# Patient Record
Sex: FEMALE | Race: WHITE | NOT HISPANIC OR LATINO | Employment: FULL TIME | URBAN - METROPOLITAN AREA
[De-identification: names, ages, dates, MRNs, and addresses within clinical notes are randomized per-mention and may not be internally consistent; named-entity substitution may affect disease eponyms.]

---

## 2017-03-02 ENCOUNTER — TELEPHONE (OUTPATIENT)
Dept: FAMILY MEDICINE | Facility: CLINIC | Age: 52
End: 2017-03-02

## 2017-03-02 NOTE — TELEPHONE ENCOUNTER
RN hypertension panel management  Offer free BP CK with the RN  Left message for the patient to call the clinic.    1. 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 It has been dropping, recheck in a couple of week with RN visit

## 2017-03-02 NOTE — LETTER
March 6, 2017      Tara Charlton  [address] [place] [address]              Dear Tara Charlton,    Your most recent blood pressure reading preformed at our clinic was higher than we like to see it. The goal is to have it under 140/90 in clinic. Please call our clinic [phone] to schedule a blood pressure recheck on our RN schedule. This appointment is free of charge and takes about 15 minutes to complete. Be sure to take all of your blood pressure medications, and avoid stimulants like caffeine, cold medicines, sudafed, or tobacco prior to your recheck.    If your blood pressure medication was changed by your provider recently wait 2 weeks before making this recheck appointment.     Thank you for trusting us with your health care.    Sincerely,    Dago Martinez MD / gallo

## 2017-03-06 NOTE — TELEPHONE ENCOUNTER
Left message for patient to return call to clinic.  Letter sent.  Closing encounter.  Dimple LOPEZ RN

## 2017-03-08 ENCOUNTER — TELEPHONE (OUTPATIENT)
Dept: FAMILY MEDICINE | Facility: CLINIC | Age: 52
End: 2017-03-08

## 2017-03-09 NOTE — TELEPHONE ENCOUNTER
"Clinic Action Needed:No/FYI only  Reason for Call: \"I've received a couple of call from the clinic, I think I need to come in for labs\". Advised Tara that she is need of Thyroid lab work, TSH and T4 check.  Tara appears to understand directives and agrees with plan. Transferred caller to scheduling to make a lab appointment.     Routed to: KARINA Martinez Care Team [facility]    Ana Jean Baptiste, RN  [facility]        "

## 2017-03-23 ENCOUNTER — TELEPHONE (OUTPATIENT)
Dept: FAMILY MEDICINE | Facility: CLINIC | Age: 52
End: 2017-03-23

## 2017-03-23 ENCOUNTER — ALLIED HEALTH/NURSE VISIT (OUTPATIENT)
Dept: FAMILY MEDICINE | Facility: CLINIC | Age: 52
End: 2017-03-23
Payer: COMMERCIAL

## 2017-03-23 VITALS — HEART RATE: 96 BPM | SYSTOLIC BLOOD PRESSURE: 141 MMHG | DIASTOLIC BLOOD PRESSURE: 85 MMHG

## 2017-03-23 DIAGNOSIS — Z01.30 BLOOD PRESSURE CHECK: Primary | ICD-10-CM

## 2017-03-23 DIAGNOSIS — I10 ESSENTIAL HYPERTENSION: ICD-10-CM

## 2017-03-23 DIAGNOSIS — E03.9 HYPOTHYROIDISM, UNSPECIFIED TYPE: ICD-10-CM

## 2017-03-23 LAB
T4 FREE SERPL-MCNC: 1.13 NG/DL (ref 0.76–1.46)
TSH SERPL DL<=0.05 MIU/L-ACNC: 3.65 MU/L (ref 0.4–4)

## 2017-03-23 PROCEDURE — 84443 ASSAY THYROID STIM HORMONE: CPT | Performed by: FAMILY MEDICINE

## 2017-03-23 PROCEDURE — 99207 ZZC NO CHARGE NURSE ONLY: CPT

## 2017-03-23 PROCEDURE — 84439 ASSAY OF FREE THYROXINE: CPT | Performed by: FAMILY MEDICINE

## 2017-03-23 PROCEDURE — 36415 COLL VENOUS BLD VENIPUNCTURE: CPT | Performed by: FAMILY MEDICINE

## 2017-03-23 NOTE — NURSING NOTE
Patient here for a blood pressure check today.  Was called for panel management.  Patient reports that she is taking her blood pressure medication as prescribed.  Denies symptoms of high or low blood pressure today.  Patient also had labs drawn today.  Blood pressure taken x3 because patient was talking during the second reading.    Odalis Moy RN

## 2017-03-23 NOTE — MR AVS SNAPSHOT
"              After Visit Summary   3/23/2017    Tara Charlton    MRN: [id number]           Patient Information     Date Of Birth          1965        Visit Information        Provider Department      3/23/2017 3:45 PM SAHIL BARRERA/KHLOE RODRIGUEZ [facility]        Today's Diagnoses     Blood pressure check    -  1       Follow-ups after your visit        Who to contact     If you have questions or need follow up information about today's clinic visit or your schedule please contact [facility] directly at [phone].  Normal or non-critical lab and imaging results will be communicated to you by KineMedhart, letter or phone within 4 business days after the clinic has received the results. If you do not hear from us within 7 days, please contact the clinic through KineMedhart or phone. If you have a critical or abnormal lab result, we will notify you by phone as soon as possible.  Submit refill requests through Godengo or call your pharmacy and they will forward the refill request to us. Please allow 3 business days for your refill to be completed.          Additional Information About Your Visit        MyChart Information     Godengo lets you send messages to your doctor, view your test results, renew your prescriptions, schedule appointments and more. To sign up, go to [email or web address] . Click on \"Log in\" on the left side of the screen, which will take you to the Welcome page. Then click on \"Sign up Now\" on the right side of the page.     You will be asked to enter the access code listed below, as well as some personal information. Please follow the directions to create your username and password.     Your access code is: [email or web address]  Expires: 3/28/2017  4:45 PM     Your access code will  in 90 days. If you need help or a new code, please call your [facility] or [phone].        Care EveryWhere ID     This is your Care EveryWhere ID. This could be used by other organizations to " access your [place] medical records  [id number]        Your Vitals Were     Pulse                   96            Blood Pressure from Last 3 Encounters:   03/23/17 141/85   12/28/16 156/78   08/26/15 134/70    Weight from Last 3 Encounters:   12/28/16 163 lb (73.9 kg)   08/26/15 168 lb (76.2 kg)   08/19/13 162 lb (73.5 kg)              Today, you had the following     No orders found for display       Primary Care Provider Office Phone # Fax #    Dago Martinez [phone]       [facility] [address] [place] [address]        Thank you!     Thank you for choosing [facility]  for your care. Our goal is always to provide you with excellent care. Hearing back from our patients is one way we can continue to improve our services. Please take a few minutes to complete the written survey that you may receive in the mail after your visit with us. Thank you!             Your Updated Medication List - Protect others around you: Learn how to safely use, store and throw away your medicines at www.disposemymeds.org.          This list is accurate as of: 3/23/17  4:39 PM.  Always use your most recent med list.                   Brand Name Dispense Instructions for use    buPROPion 150 MG 12 hr tablet    WELLBUTRIN SR    180 tablet    Take one tab daily for 3 days, then increase to bid, quit smoking on day 7.  Continue the medication for 2-3 months.       hydrochlorothiazide 12.5 MG capsule    MICROZIDE    90 capsule    Take 1 capsule (12.5 mg) by mouth every morning       levothyroxine 50 MCG tablet    SYNTHROID    90 tablet    Take 1 tablet (50 mcg) by mouth daily Due for labs and annual appointment 8/2016       metoprolol 100 MG 24 hr tablet    TOPROL-XL    90 tablet    Take 1 tablet (100 mg) by mouth daily

## 2017-03-23 NOTE — TELEPHONE ENCOUNTER
S-(situation): Patient here for a blood pressure check today.    B-(background): Was called for panel management. Patient reports that she is taking her blood pressure medication as prescribed. Denies symptoms of high or low blood pressure today. Patient also had labs drawn today.  BP Readings from Last 5 Encounters:   03/23/17 141/85   12/28/16 156/78   08/26/15 134/70   08/19/13 130/70   05/02/12 130/70     First two blood pressure readings today were 146/88 and 164/95    A-(assessment): Blood pressure taken x3 because patient was talking during the second reading.    R-(recommendations): Please advise.    Odalis Moy RN

## 2017-03-24 RX ORDER — LEVOTHYROXINE SODIUM 50 UG/1
50 TABLET ORAL DAILY
Qty: 90 TABLET | Refills: 3 | Status: SHIPPED | OUTPATIENT
Start: 2017-03-24 | End: 2018-03-09

## 2017-03-24 NOTE — TELEPHONE ENCOUNTER
Her blood pressure is still borderline.  I recommend to increase hydrochlorothiazide to 25 mg a day.  Please call in a prescription for #30 with no refill to her local pharmacy.  Recheck her blood pressure in 2 weeks.  Sincerely,  Dago Martinez MD

## 2017-03-27 RX ORDER — HYDROCHLOROTHIAZIDE 25 MG/1
25 TABLET ORAL DAILY
Qty: 30 TABLET | Refills: 0 | Status: SHIPPED | OUTPATIENT
Start: 2017-03-27 | End: 2017-05-05

## 2017-03-27 NOTE — TELEPHONE ENCOUNTER
Tried to reach her, Left message on answering machine for patient to call back.  Per Dr Martinez's note below, need to know which local pharmacy (she has mail order pharmacies on her chart)   Diane Payton RNC

## 2017-03-27 NOTE — TELEPHONE ENCOUNTER
Pt returned call and will fill Rx here at pharmacy in house   And call back and make apt for RN BP check   Declined apt at this time     Jacqueline Jiménez  Clinic Station

## 2017-03-30 ENCOUNTER — TELEPHONE (OUTPATIENT)
Dept: FAMILY MEDICINE | Facility: CLINIC | Age: 52
End: 2017-03-30

## 2017-03-31 NOTE — TELEPHONE ENCOUNTER
Patient left a voicemail at 637pm [facility]. Wants to know why her medications are waiting for her at [facility]. Her medications should be sent to her mail order pharmacy through her insurance. Why was it switched to [facility]?    Sheba VAZQUEZ  Central Scheduler

## 2017-03-31 NOTE — TELEPHONE ENCOUNTER
Left message for patient to return call to clinic.    CSS ok to deliver message below.    Which pharmacy does she prefer?  We will send RX to correct pharmacy.    Maryanne LOPEZ Rn

## 2017-04-04 NOTE — TELEPHONE ENCOUNTER
Pt was seen 3/23/17 for blood pressure.  hctz was sent to local pharmacy while waiting for pt to return for RN BP recheck.  Pt is aware.  Usually uses mail order but unable to do so until blood pressure regimen is regulated.  America Neal RN

## 2017-04-19 ENCOUNTER — TELEPHONE (OUTPATIENT)
Dept: FAMILY MEDICINE | Facility: CLINIC | Age: 52
End: 2017-04-19

## 2017-04-19 NOTE — LETTER
April 24, 2017      Tara Charlton  [address] [address]            Dear Tara,    Your most recent blood pressure reading preformed at our clinic was higher than we like to see it. The goal is to have it under 140/90 in clinic. Please call our clinic [phone] to schedule a blood pressure recheck on our RN schedule. This appointment is free of charge and takes about 15 minutes to complete. Be sure to take all of your blood pressure medications, and avoid stimulants like caffeine, cold medicines, sudafed, or tobacco prior to your recheck.    If your blood pressure medication was changed by your provider recently wait 2 weeks before making this recheck appointment.     Thank you for trusting us with your health care.  Sincerely,    Dago Martinez MD / nb

## 2017-04-19 NOTE — TELEPHONE ENCOUNTER
Panel Management Review      Patient has the following on her problem list:     Hypertension   Last three blood pressure readings:  BP Readings from Last 3 Encounters:   03/23/17 141/85   12/28/16 156/78   08/26/15 134/70     Blood pressure: FAILED    HTN Guidelines:  Age 18-59 BP range:  Less than 140/90  Age 60-85 with Diabetes:  Less than 140/90  Age 60-85 without Diabetes:  less than 150/90      Composite cancer screening  Chart review shows that this patient is due/due soon for the following Mammogram, Colonoscopy and Fecal Colorectal (FIT)  Summary:    Patient is due/failing the following:   BP CHECK    Action needed:   Patient needs nurse only appointment.    Type of outreach:    will call her, too early in the day right now.     Questions for provider review:    None                                                                                                                                    Diane LOVE       Chart routed to Care Team .

## 2017-04-24 NOTE — TELEPHONE ENCOUNTER
Left message for patient to return call to clinic.  Sent Letter  Closing Encounter, waiting to hear back from patient.    Maryanne LOPEZ Rn

## 2017-05-05 ENCOUNTER — ALLIED HEALTH/NURSE VISIT (OUTPATIENT)
Dept: FAMILY MEDICINE | Facility: CLINIC | Age: 52
End: 2017-05-05
Payer: COMMERCIAL

## 2017-05-05 VITALS
OXYGEN SATURATION: 95 % | HEART RATE: 81 BPM | DIASTOLIC BLOOD PRESSURE: 95 MMHG | RESPIRATION RATE: 18 BRPM | SYSTOLIC BLOOD PRESSURE: 151 MMHG

## 2017-05-05 DIAGNOSIS — I10 ESSENTIAL HYPERTENSION: ICD-10-CM

## 2017-05-05 DIAGNOSIS — I10 HTN (HYPERTENSION): Primary | ICD-10-CM

## 2017-05-05 PROCEDURE — 99207 ZZC NO CHARGE NURSE ONLY: CPT

## 2017-05-05 RX ORDER — HYDROCHLOROTHIAZIDE 25 MG/1
25 TABLET ORAL DAILY
Qty: 90 TABLET | Refills: 0 | Status: SHIPPED | OUTPATIENT
Start: 2017-05-05 | End: 2017-06-28

## 2017-05-05 NOTE — MR AVS SNAPSHOT
"              After Visit Summary   2017    Tara Charlton    MRN: [id number]           Patient Information     Date Of Birth          1965        Visit Information        Provider Department      2017 3:15 PM SAHIL BARRERA/KHLOE RODRIGUEZ [facility]        Today's Diagnoses     HTN (hypertension)    -  1    Essential hypertension           Follow-ups after your visit        Who to contact     If you have questions or need follow up information about today's clinic visit or your schedule please contact [facility] directly at [phone].  Normal or non-critical lab and imaging results will be communicated to you by TOMS Shoeshart, letter or phone within 4 business days after the clinic has received the results. If you do not hear from us within 7 days, please contact the clinic through TOMS Shoeshart or phone. If you have a critical or abnormal lab result, we will notify you by phone as soon as possible.  Submit refill requests through REBIScan or call your pharmacy and they will forward the refill request to us. Please allow 3 business days for your refill to be completed.          Additional Information About Your Visit        MyChart Information     REBIScan lets you send messages to your doctor, view your test results, renew your prescriptions, schedule appointments and more. To sign up, go to [email or web address] . Click on \"Log in\" on the left side of the screen, which will take you to the Welcome page. Then click on \"Sign up Now\" on the right side of the page.     You will be asked to enter the access code listed below, as well as some personal information. Please follow the directions to create your username and password.     Your access code is: [email or web address]  Expires: 8/3/2017  3:44 PM     Your access code will  in 90 days. If you need help or a new code, please call your [facility] or [phone].        Care EveryWhere ID     This is your Care EveryWhere ID. This could be used by " other organizations to access your [place] medical records  [id number]        Your Vitals Were     Pulse Respirations Pulse Oximetry             81 18 95%          Blood Pressure from Last 3 Encounters:   05/05/17 (!) 151/95   03/23/17 141/85   12/28/16 156/78    Weight from Last 3 Encounters:   12/28/16 163 lb (73.9 kg)   08/26/15 168 lb (76.2 kg)   08/19/13 162 lb (73.5 kg)              Today, you had the following     No orders found for display         Today's Medication Changes          These changes are accurate as of: 5/5/17  3:44 PM.  If you have any questions, ask your nurse or doctor.               These medicines have changed or have updated prescriptions.        Dose/Directions    hydrochlorothiazide 25 MG tablet   Commonly known as:  HYDRODIURIL   This may have changed:  Another medication with the same name was removed. Continue taking this medication, and follow the directions you see here.   Used for:  Essential hypertension        Dose:  25 mg   Take 1 tablet (25 mg) by mouth daily   Quantity:  90 tablet   Refills:  0            Where to get your medicines      These medications were sent to [facility] - [address], [place] [address]     Phone:  [phone]     hydrochlorothiazide 25 MG tablet                Primary Care Provider Office Phone # Fax #    Dago Martinez [phone]       [facility] [address] [address]        Thank you!     Thank you for choosing [facility]  for your care. Our goal is always to provide you with excellent care. Hearing back from our patients is one way we can continue to improve our services. Please take a few minutes to complete the written survey that you may receive in the mail after your visit with us. Thank you!             Your Updated Medication List - Protect others around you: Learn how to safely use, store and throw away  your medicines at www.disposemymeds.org.          This list is accurate as of: 5/5/17  3:44 PM.  Always use your most recent med list.                   Brand Name Dispense Instructions for use    buPROPion 150 MG 12 hr tablet    WELLBUTRIN SR    180 tablet    Take one tab daily for 3 days, then increase to bid, quit smoking on day 7.  Continue the medication for 2-3 months.       hydrochlorothiazide 25 MG tablet    HYDRODIURIL    90 tablet    Take 1 tablet (25 mg) by mouth daily       levothyroxine 50 MCG tablet    SYNTHROID    90 tablet    Take 1 tablet (50 mcg) by mouth daily       metoprolol 100 MG 24 hr tablet    TOPROL-XL    90 tablet    Take 1 tablet (100 mg) by mouth daily

## 2017-05-05 NOTE — NURSING NOTE
Patient comes into the clinic today for a BP CK.  Medications and allergies are gone over with the patient and are up to date.  Nat HINES RN

## 2017-06-28 ENCOUNTER — TELEPHONE (OUTPATIENT)
Dept: FAMILY MEDICINE | Facility: CLINIC | Age: 52
End: 2017-06-28

## 2017-06-28 DIAGNOSIS — I10 ESSENTIAL HYPERTENSION: ICD-10-CM

## 2017-06-28 NOTE — TELEPHONE ENCOUNTER
hydrochlorothiazide      Last Written Prescription Date: 05/05/2017  Last Fill Quantity: 90, # refills: 0  Last Office Visit with G, P or [facility] prescribing provider: 12/28/2016       Potassium   Date Value Ref Range Status   12/28/2016 3.3 (L) 3.4 - 5.3 mmol/L Final     Creatinine   Date Value Ref Range Status   12/28/2016 0.81 0.52 - 1.04 mg/dL Final     BP Readings from Last 3 Encounters:   05/05/17 (!) 151/95   03/23/17 141/85   12/28/16 156/78

## 2017-07-06 NOTE — TELEPHONE ENCOUNTER
Routing refill request to provider for review/approval because:  Labs out of range:  3.3 k+    Blood pressure above goal.    Thank you  Nara MEDRANO RN

## 2017-07-07 RX ORDER — HYDROCHLOROTHIAZIDE 25 MG/1
25 TABLET ORAL DAILY
Qty: 30 TABLET | Refills: 0 | Status: SHIPPED | OUTPATIENT
Start: 2017-07-07 | End: 2017-08-09

## 2017-07-07 NOTE — TELEPHONE ENCOUNTER
Left message for pt to call the care team for a provider message   Waiting on call back     Jacqueline Jiménez  Clinic Station

## 2017-07-11 ENCOUNTER — NURSE TRIAGE (OUTPATIENT)
Dept: NURSING | Facility: CLINIC | Age: 52
End: 2017-07-11

## 2017-08-09 ENCOUNTER — HOSPITAL ENCOUNTER (OUTPATIENT)
Dept: MAMMOGRAPHY | Facility: CLINIC | Age: 52
Discharge: HOME | End: 2017-08-09
Attending: FAMILY MEDICINE | Admitting: FAMILY MEDICINE
Payer: COMMERCIAL

## 2017-08-09 ENCOUNTER — OFFICE VISIT (OUTPATIENT)
Dept: FAMILY MEDICINE | Facility: CLINIC | Age: 52
End: 2017-08-09
Payer: COMMERCIAL

## 2017-08-09 VITALS
HEIGHT: 64 IN | SYSTOLIC BLOOD PRESSURE: 130 MMHG | DIASTOLIC BLOOD PRESSURE: 72 MMHG | HEART RATE: 60 BPM | BODY MASS INDEX: 29.19 KG/M2 | WEIGHT: 171 LBS | TEMPERATURE: 98.6 F

## 2017-08-09 DIAGNOSIS — Z12.31 ENCOUNTER FOR SCREENING MAMMOGRAM FOR BREAST CANCER: ICD-10-CM

## 2017-08-09 DIAGNOSIS — Z12.31 VISIT FOR SCREENING MAMMOGRAM: ICD-10-CM

## 2017-08-09 DIAGNOSIS — I10 ESSENTIAL HYPERTENSION: Primary | ICD-10-CM

## 2017-08-09 LAB — POTASSIUM SERPL-SCNC: 3.6 MMOL/L (ref 3.4–5.3)

## 2017-08-09 PROCEDURE — 36415 COLL VENOUS BLD VENIPUNCTURE: CPT | Performed by: FAMILY MEDICINE

## 2017-08-09 PROCEDURE — 99213 OFFICE O/P EST LOW 20 MIN: CPT | Performed by: FAMILY MEDICINE

## 2017-08-09 PROCEDURE — 84132 ASSAY OF SERUM POTASSIUM: CPT | Performed by: FAMILY MEDICINE

## 2017-08-09 PROCEDURE — G0202 SCR MAMMO BI INCL CAD: HCPCS

## 2017-08-09 RX ORDER — HYDROCHLOROTHIAZIDE 25 MG/1
25 TABLET ORAL DAILY
Qty: 90 TABLET | Refills: 3 | Status: SHIPPED | OUTPATIENT
Start: 2017-08-09 | End: 2018-08-14

## 2017-08-09 NOTE — PATIENT INSTRUCTIONS
Your blood pressure looks great!    I am going to check your potassium.    Please get your mammogram done today.          Thank you for choosing [facility].  You may be receiving a survey in the mail from [employer] regarding your visit today.  Please take a few minutes to complete and return the survey to let us know how we are doing.      If you have questions or concerns, please contact us via [employer] or you can contact your care team at [phone].    Our Clinic hours are:  Monday 6:40 am  to 7:00 pm  Tuesday -Friday 6:40 am to 5:00 pm    The [place] outpatient lab hours are:  Monday - Friday 6:10 am to 4:45 pm  Saturdays 7:00 am to 11:00 am  Appointments are required, call [phone]    If you have clinical questions after hours or would like to schedule an appointment,  call the clinic at [phone].

## 2017-08-09 NOTE — MR AVS SNAPSHOT
After Visit Summary   8/9/2017    Tara Charlton    MRN: [id number]           Patient Information     Date Of Birth          1965        Visit Information        Provider Department      8/9/2017 2:40 PM Dago Martinez MD [facility]        Today's Diagnoses     Essential hypertension          Care Instructions    Your blood pressure looks great!    I am going to check your potassium.    Please get your mammogram done today.          Thank you for choosing [facility].  You may be receiving a survey in the mail from [employer] regarding your visit today.  Please take a few minutes to complete and return the survey to let us know how we are doing.      If you have questions or concerns, please contact us via [facility] or you can contact your care team at [phone].    Our Clinic hours are:  Monday 6:40 am  to 7:00 pm  Tuesday -Friday 6:40 am to 5:00 pm    The [place] outpatient lab hours are:  Monday - Friday 6:10 am to 4:45 pm  Saturdays 7:00 am to 11:00 am  Appointments are required, call [phone]    If you have clinical questions after hours or would like to schedule an appointment,  call the clinic at [phone].            Follow-ups after your visit        Your next 10 appointments already scheduled     Aug 09, 2017  3:45 PM CDT   MA SCREENING DIGITAL BILATERAL with [address] [facility] ([facility])    [address] [facility] [id number] [phone]           Do not use any powder, lotion or deodorant under your arms or on your breast. If you do, we will ask you to remove it before your exam.  Wear comfortable, two-piece clothing.  If you have any allergies, tell your care team.  Bring any previous mammograms from other facilities or have them mailed to the breast center. Three-dimensional (3D) mammograms are available at [facility] locations in [facility], and [place]. [facility]  "locations include [facility] in [place]. Benefits of 3D mammograms include: - Improved rate of cancer detection - Decreases your chance of having to go back for more tests, which means fewer: - \"False-positive\" results (This means that there is an abnormal area but it isn't cancer.) - Invasive testing procedures, such as a biopsy or surgery - Can provide clearer images of the breast if you have dense breast tissue. 3D mammography is an optional exam that anyone can have with a 2D mammogram. It doesn't replace or take the place of a 2D mammogram. 2D mammograms remain an effective screening test for all women.  Not all insurance companies cover the cost of a 3D mammogram. Check with your insurance.              Future tests that were ordered for you today     Open Future Orders        Priority Expected Expires Ordered    MA Screening Digital Bilateral Routine  8/9/2018 8/9/2017            Who to contact     If you have questions or need follow up information about today's clinic visit or your schedule please contact [facility] directly at [phone].  Normal or non-critical lab and imaging results will be communicated to you by [employer], letter or phone within 4 business days after the clinic has received the results. If you do not hear from us within 7 days, please contact the clinic through [employer] or phone. If you have a critical or abnormal lab result, we will notify you by phone as soon as possible.  Submit refill requests through [employer] or call your pharmacy and they will forward the refill request to us. Please allow 3 business days for your refill to be completed.          Additional Information About Your Visit        [employer] Information     [employer] lets you send messages to your doctor, view your test results, renew your prescriptions, schedule appointments and more. To sign up, go to [email or web address] . Click on \"Log in\" on the left side of the screen, " "which will take you to the Welcome page. Then click on \"Sign up Now\" on the right side of the page.     You will be asked to enter the access code listed below, as well as some personal information. Please follow the directions to create your username and password.     Your access code is: [email or web address]  Expires: 2017  3:12 PM     Your access code will  in 90 days. If you need help or a new code, please call your [facility] or [phone].        Care EveryWhere ID     This is your Care EveryWhere ID. This could be used by other organizations to access your [place] medical records  [id number]        Your Vitals Were     Pulse Temperature Height BMI (Body Mass Index)          60 98.6  F (37  C) (Tympanic) 5' 3.75\" (1.619 m) 29.58 kg/m2         Blood Pressure from Last 3 Encounters:   17 130/72   17 (!) 151/95   17 141/85    Weight from Last 3 Encounters:   17 171 lb (77.6 kg)   16 163 lb (73.9 kg)   08/26/15 168 lb (76.2 kg)              We Performed the Following     Potassium          Today's Medication Changes          These changes are accurate as of: 17  3:12 PM.  If you have any questions, ask your nurse or doctor.               These medicines have changed or have updated prescriptions.        Dose/Directions    hydrochlorothiazide 25 MG tablet   Commonly known as:  HYDRODIURIL   This may have changed:  additional instructions   Used for:  Essential hypertension   Changed by:  Dago Martinez MD        Dose:  25 mg   Take 1 tablet (25 mg) by mouth daily   Quantity:  90 tablet   Refills:  3            Where to get your medicines      These medications were sent to [facility] - [address], [facility] [address]     Phone:  [phone]     hydrochlorothiazide 25 MG tablet                Primary Care Provider Office Phone # Fax #    Dago Martinez [phone] [address] [facility] [address]        Equal Access to Services     DARIEN ALONSO : Hadii aad ku hadratnaalicia Austin, waaxda lumurtazaadaha, qaybta delvisjeremiviraj godinez, millicent solomon. So [facility] [phone].    ATENCIÓN: Si habla español, tiene a palencia disposición servicios gratuitos de asistencia lingüística. Llame al [phone].    We comply with applicable federal civil rights laws and [place] laws. We do not discriminate on the basis of race, color, national origin, age, disability sex, sexual orientation or gender identity.            Thank you!     Thank you for choosing [facility]  for your care. Our goal is always to provide you with excellent care. Hearing back from our patients is one way we can continue to improve our services. Please take a few minutes to complete the written survey that you may receive in the mail after your visit with us. Thank you!             Your Updated Medication List - Protect others around you: Learn how to safely use, store and throw away your medicines at www.disposemymeds.org.          This list is accurate as of: 8/9/17  3:12 PM.  Always use your most recent med list.                   Brand Name Dispense Instructions for use Diagnosis    buPROPion 150 MG 12 hr tablet    WELLBUTRIN SR    180 tablet    Take one tab daily for 3 days, then increase to bid, quit smoking on day 7.  Continue the medication for 2-3 months.    Tobacco abuse       hydrochlorothiazide 25 MG tablet    HYDRODIURIL    90 tablet    Take 1 tablet (25 mg) by mouth daily    Essential hypertension       levothyroxine 50 MCG tablet    SYNTHROID    90 tablet    Take 1 tablet (50 mcg) by mouth daily    Hypothyroidism, unspecified type       metoprolol 100 MG 24 hr tablet    TOPROL-XL    90 tablet    Take 1 tablet (100 mg) by mouth daily    Essential hypertension

## 2017-08-09 NOTE — PROGRESS NOTES
"  SUBJECTIVE:                                                    Tara KUO White is a 52 year old female who presents to clinic today for the following health issues:    Chief Complaint   Patient presents with     Refill Request     needs to follow up from HCTZ dose adjustments.     Health Maintenance     is due for mammogram and colonoscopy for cancer screenings         Hypertension Follow-up      Outpatient blood pressures are not being checked.    Low Salt Diet: not monitoring salt      Amount of exercise or physical activity: very active job    Problems taking medications regularly: Yes,  problems remembering to take when at work    Medication side effects: none  Diet: low salt    Patient bp was not controlled.  She is here for bp check.  She also had a borderline potassium.  No side effect of the change in bp med.  Doing well.       Problem list and histories reviewed & adjusted, as indicated.  Additional history: as documented        Reviewed and updated as needed this visit by clinical staffTobacco  Allergies  Med Hx  Surg Hx  Fam Hx  Soc Hx      Reviewed and updated as needed this visit by Provider         ROS:  CONSTITUTIONAL:NEGATIVE for fever, chills, change in weight  RESP:NEGATIVE for significant cough or SOB  CV: NEGATIVE for chest pain, palpitations or peripheral edema  MUSCULOSKELETAL: NEGATIVE for significant arthralgias or myalgia    OBJECTIVE:                                                    /72 (Cuff Size: Adult Large)  Pulse 60  Temp 98.6  F (37  C) (Tympanic)  Ht 5' 3.75\" (1.619 m)  Wt 171 lb (77.6 kg)  BMI 29.58 kg/m2  Body mass index is 29.58 kg/(m^2).  GENERAL APPEARANCE: healthy, alert and no distress  SKIN: no suspicious lesions or rashes  NEURO: Normal strength and tone, mentation intact and speech normal         ASSESSMENT/PLAN:                                                    1. Essential hypertension  Controlled, refilled med, recheck K+  - hydrochlorothiazide " (HYDRODIURIL) 25 MG tablet; Take 1 tablet (25 mg) by mouth daily  Dispense: 90 tablet; Refill: 3  - Potassium      See Patient Instructions  Also needed mammogram so she will have this done today.  Patient was escorted to mammography.    Dago Martinez MD  [facility]

## 2017-08-09 NOTE — NURSING NOTE
"Chief Complaint   Patient presents with     Refill Request     needs to follow up from HCTZ dose adjustments.     Health Maintenance     is due for mammogram and colonoscopy for cancer screenings       Initial /84 (Cuff Size: Adult Large)  Pulse 60  Temp 98.6  F (37  C) (Tympanic)  Ht 5' 3.75\" (1.619 m)  Wt 171 lb (77.6 kg)  BMI 29.58 kg/m2 Estimated body mass index is 29.58 kg/(m^2) as calculated from the following:    Height as of this encounter: 5' 3.75\" (1.619 m).    Weight as of this encounter: 171 lb (77.6 kg).  Medication Reconciliation: complete  "

## 2017-08-09 NOTE — LETTER
Tara Charlton  [address]  [place] [address]        August 10, 2017          Dear ,    We are writing to inform you of your test results.  Normal potassium level.  Component      Latest Ref Rng & Units 8/9/2017   Potassium      3.4 - 5.3 mmol/L 3.6     If you have any questions or concerns, please call the clinic at the number listed above.       Sincerely,        Dago Martinez MD

## 2017-09-06 ENCOUNTER — HOSPITAL ENCOUNTER (OUTPATIENT)
Dept: MAMMOGRAPHY | Facility: CLINIC | Age: 52
Discharge: HOME | End: 2017-09-06
Attending: FAMILY MEDICINE | Admitting: FAMILY MEDICINE
Payer: COMMERCIAL

## 2017-09-06 DIAGNOSIS — R92.8 ABNORMAL MAMMOGRAM: ICD-10-CM

## 2017-09-06 DIAGNOSIS — R92.8 ABNORMAL MAMMOGRAM: Primary | ICD-10-CM

## 2017-09-06 PROCEDURE — G0206 DX MAMMO INCL CAD UNI: HCPCS

## 2017-12-14 ENCOUNTER — TELEPHONE (OUTPATIENT)
Dept: FAMILY MEDICINE | Facility: CLINIC | Age: 52
End: 2017-12-14

## 2017-12-14 DIAGNOSIS — I10 ESSENTIAL HYPERTENSION: ICD-10-CM

## 2017-12-18 NOTE — TELEPHONE ENCOUNTER
Routing refill request to provider for review/approval because:  BP Readings from Last 6 Encounters:   08/09/17 130/72   05/05/17 (!) 151/95   03/23/17 141/85   12/28/16 156/78   08/26/15 134/70   08/19/13 130/70     Medication last reviewed on 12-28-16. HTN reviewed for HCTZ on 8-9-17 at office visit.   Please advise refill.     JENNIFER Dodd

## 2018-01-03 RX ORDER — METOPROLOL SUCCINATE 100 MG/1
TABLET, EXTENDED RELEASE ORAL
Qty: 90 TABLET | Refills: 1 | Status: SHIPPED | OUTPATIENT
Start: 2018-01-03 | End: 2018-08-14

## 2018-01-03 NOTE — TELEPHONE ENCOUNTER
Pt calling wondering what is going on with this refill. Pharmacy told her they haven't heard anything back.   Nadia Ireland  CSS

## 2018-01-11 ENCOUNTER — TELEPHONE (OUTPATIENT)
Dept: FAMILY MEDICINE | Facility: CLINIC | Age: 53
End: 2018-01-11

## 2018-01-11 NOTE — TELEPHONE ENCOUNTER
1/11/2018    Call Regarding Preventive Health Screening Colonoscopy    Attempt 1    Message on voicemail     Comments:       Outreach   Marichuy Mas

## 2018-02-02 NOTE — TELEPHONE ENCOUNTER
2/2/2018    Call Regarding Preventive Health Screening Colonoscopy    Attempt 2    Message on voicemail     Comments:       Outreach   Marichuy Mas

## 2018-02-17 NOTE — TELEPHONE ENCOUNTER
2/17/2018    Call Regarding Preventive Health Screening Colonoscopy    Attempt 3    Message on voicemail     Comments:       Outreach   CC

## 2018-03-09 DIAGNOSIS — E03.9 HYPOTHYROIDISM, UNSPECIFIED TYPE: ICD-10-CM

## 2018-03-12 NOTE — TELEPHONE ENCOUNTER
"Requested Prescriptions   Pending Prescriptions Disp Refills     levothyroxine (SYNTHROID/LEVOTHROID) 50 MCG tablet [Pharmacy Med Name: LEVOTHYROXINE  0.05MG  TAB]  Last Written Prescription Date:  03/24/2017  Last Fill Quantity: 90,  # refills: 3   Last office visit: 8/9/2017 with prescribing provider:  Juan   Future Office Visit:     90 tablet      Sig: TAKE 1 TABLET BY MOUTH  DAILY    Thyroid Protocol Passed    3/9/2018  8:27 PM       Passed - Patient is 12 years or older       Passed - Recent (12 mo) or future (30 days) visit within the authorizing provider's specialty    Patient had office visit in the last 12 months or has a visit in the next 30 days with authorizing provider or within the authorizing provider's specialty.  See \"Patient Info\" tab in inbasket, or \"Choose Columns\" in Meds & Orders section of the refill encounter.           Passed - Normal TSH on file in past 12 months    Recent Labs   Lab Test  03/23/17   1531   TSH  3.65             Passed - No active pregnancy on record    If patient is pregnant or has had a positive pregnancy test, please check TSH.         Passed - No positive pregnancy test in past 12 months    If patient is pregnant or has had a positive pregnancy test, please check TSH.          Lewis Iglesias RT (R)    "

## 2018-03-14 RX ORDER — LEVOTHYROXINE SODIUM 50 UG/1
TABLET ORAL
Qty: 90 TABLET | Refills: 0 | Status: SHIPPED | OUTPATIENT
Start: 2018-03-14 | End: 2018-08-14

## 2018-07-25 ENCOUNTER — TELEPHONE (OUTPATIENT)
Dept: FAMILY MEDICINE | Facility: CLINIC | Age: 53
End: 2018-07-25

## 2018-07-25 DIAGNOSIS — E03.9 ACQUIRED HYPOTHYROIDISM: ICD-10-CM

## 2018-07-25 DIAGNOSIS — I10 BENIGN ESSENTIAL HYPERTENSION: ICD-10-CM

## 2018-07-25 DIAGNOSIS — E78.5 HYPERLIPIDEMIA LDL GOAL <130: ICD-10-CM

## 2018-07-25 DIAGNOSIS — I10 HTN (HYPERTENSION): ICD-10-CM

## 2018-07-25 DIAGNOSIS — E03.9 HYPOTHYROIDISM: Primary | ICD-10-CM

## 2018-07-25 NOTE — TELEPHONE ENCOUNTER
Reason for Call: Request for an order or referral:    Order or referral being requested: Labs    Date needed: at your convenience    Has the patient been seen by the PCP for this problem? YES    Additional comments: Patient states she is needing labs drawn in order to get her medications refilled for Levothyroxine and Hydrochlorothiazide. She would like to get her labs drawn first before setting up an appointment with Dr. Martinez. Please advise. Thank you.    Phone number Patient can be reached at:  Home number on file [phone] (home)    Best Time:  Anytime.    Can we leave a detailed message on this number?  YES    Call taken on 7/25/2018 at 6:30 PM by Yovany Carreno

## 2018-07-26 NOTE — TELEPHONE ENCOUNTER
No Office visit appointment set up yet.   Pended BMP and TSH with T4, Lipid panel.  Please place other labs if advisable.   Provider please review and advise. Thank you.

## 2018-08-02 DIAGNOSIS — E03.9 ACQUIRED HYPOTHYROIDISM: ICD-10-CM

## 2018-08-02 DIAGNOSIS — I10 BENIGN ESSENTIAL HYPERTENSION: ICD-10-CM

## 2018-08-02 DIAGNOSIS — E78.5 HYPERLIPIDEMIA LDL GOAL <130: ICD-10-CM

## 2018-08-02 LAB
ANION GAP SERPL CALCULATED.3IONS-SCNC: 9 MMOL/L (ref 3–14)
BUN SERPL-MCNC: 17 MG/DL (ref 7–30)
CALCIUM SERPL-MCNC: 10 MG/DL (ref 8.5–10.1)
CHLORIDE SERPL-SCNC: 98 MMOL/L (ref 94–109)
CHOLEST SERPL-MCNC: 275 MG/DL
CO2 SERPL-SCNC: 31 MMOL/L (ref 20–32)
CREAT SERPL-MCNC: 1.07 MG/DL (ref 0.52–1.04)
GFR SERPL CREATININE-BSD FRML MDRD: 53 ML/MIN/1.7M2
GLUCOSE SERPL-MCNC: 95 MG/DL (ref 70–99)
HDLC SERPL-MCNC: 64 MG/DL
LDLC SERPL CALC-MCNC: 177 MG/DL
NONHDLC SERPL-MCNC: 211 MG/DL
POTASSIUM SERPL-SCNC: 2.8 MMOL/L (ref 3.4–5.3)
SODIUM SERPL-SCNC: 138 MMOL/L (ref 133–144)
TRIGL SERPL-MCNC: 169 MG/DL
TSH SERPL DL<=0.005 MIU/L-ACNC: 3.52 MU/L (ref 0.4–4)

## 2018-08-02 PROCEDURE — 80048 BASIC METABOLIC PNL TOTAL CA: CPT | Performed by: INTERNAL MEDICINE

## 2018-08-02 PROCEDURE — 84443 ASSAY THYROID STIM HORMONE: CPT | Performed by: INTERNAL MEDICINE

## 2018-08-02 PROCEDURE — 80061 LIPID PANEL: CPT | Performed by: INTERNAL MEDICINE

## 2018-08-02 PROCEDURE — 36415 COLL VENOUS BLD VENIPUNCTURE: CPT | Performed by: INTERNAL MEDICINE

## 2018-08-03 DIAGNOSIS — E87.6 HYPOKALEMIA: Primary | ICD-10-CM

## 2018-08-03 RX ORDER — POTASSIUM CHLORIDE 1500 MG/1
20 TABLET, EXTENDED RELEASE ORAL DAILY
Qty: 30 TABLET | Refills: 0 | Status: SHIPPED | OUTPATIENT
Start: 2018-08-03 | End: 2018-08-29

## 2018-08-14 ENCOUNTER — TELEPHONE (OUTPATIENT)
Dept: FAMILY MEDICINE | Facility: CLINIC | Age: 53
End: 2018-08-14

## 2018-08-14 DIAGNOSIS — E03.9 HYPOTHYROIDISM, UNSPECIFIED TYPE: ICD-10-CM

## 2018-08-14 DIAGNOSIS — I10 ESSENTIAL HYPERTENSION: ICD-10-CM

## 2018-08-14 RX ORDER — HYDROCHLOROTHIAZIDE 25 MG/1
25 TABLET ORAL DAILY
Qty: 30 TABLET | Refills: 0 | Status: SHIPPED | OUTPATIENT
Start: 2018-08-14 | End: 2018-08-29

## 2018-08-14 RX ORDER — LEVOTHYROXINE SODIUM 50 UG/1
TABLET ORAL
Qty: 30 TABLET | Refills: 0 | Status: SHIPPED | OUTPATIENT
Start: 2018-08-14 | End: 2018-08-29

## 2018-08-14 RX ORDER — METOPROLOL SUCCINATE 100 MG/1
TABLET, EXTENDED RELEASE ORAL
Qty: 30 TABLET | Refills: 0 | Status: SHIPPED | OUTPATIENT
Start: 2018-08-14 | End: 2018-08-29

## 2018-08-14 NOTE — TELEPHONE ENCOUNTER
Reason for Call: Request for an order or referral:    Order or referral being requested: Potassium    Date needed: as soon as possible    Has the patient been seen by the PCP for this problem? YES    Additional comments: Pt had blood draw and had low potassium so she was told to come back in 5 days to have labs and then make PCP appointment. Pt is out of meds on 8/22/18. Are there any other labs she needs and does she need to fast?    Phone number Patient can be reached at:  Home number on file [phone] (home)    Best Time:  any    Can we leave a detailed message on this number?      Call taken on 8/14/2018 at 3:38 PM by Nadia Ireland

## 2018-08-14 NOTE — TELEPHONE ENCOUNTER
Orders entered per result notes. She needed refills of medications to get her to her appt this month. Those were given    CHAR MorrisonN, RN

## 2018-08-28 DIAGNOSIS — E03.9 HYPOTHYROIDISM, UNSPECIFIED TYPE: ICD-10-CM

## 2018-08-28 LAB
ANION GAP SERPL CALCULATED.3IONS-SCNC: 11 MMOL/L (ref 3–14)
BUN SERPL-MCNC: 18 MG/DL (ref 7–30)
CALCIUM SERPL-MCNC: 9.2 MG/DL (ref 8.5–10.1)
CHLORIDE SERPL-SCNC: 103 MMOL/L (ref 94–109)
CO2 SERPL-SCNC: 29 MMOL/L (ref 20–32)
CREAT SERPL-MCNC: 0.91 MG/DL (ref 0.52–1.04)
GFR SERPL CREATININE-BSD FRML MDRD: 64 ML/MIN/1.7M2
GLUCOSE SERPL-MCNC: 139 MG/DL (ref 70–99)
POTASSIUM SERPL-SCNC: 3.5 MMOL/L (ref 3.4–5.3)
SODIUM SERPL-SCNC: 143 MMOL/L (ref 133–144)

## 2018-08-28 PROCEDURE — 36415 COLL VENOUS BLD VENIPUNCTURE: CPT | Performed by: FAMILY MEDICINE

## 2018-08-28 PROCEDURE — 80048 BASIC METABOLIC PNL TOTAL CA: CPT | Performed by: FAMILY MEDICINE

## 2018-08-29 ENCOUNTER — OFFICE VISIT (OUTPATIENT)
Dept: FAMILY MEDICINE | Facility: CLINIC | Age: 53
End: 2018-08-29
Payer: COMMERCIAL

## 2018-08-29 VITALS
HEART RATE: 76 BPM | BODY MASS INDEX: 29.76 KG/M2 | WEIGHT: 172 LBS | SYSTOLIC BLOOD PRESSURE: 136 MMHG | TEMPERATURE: 98.2 F | OXYGEN SATURATION: 98 % | DIASTOLIC BLOOD PRESSURE: 70 MMHG

## 2018-08-29 DIAGNOSIS — I10 ESSENTIAL HYPERTENSION: Primary | ICD-10-CM

## 2018-08-29 DIAGNOSIS — E78.5 HYPERLIPIDEMIA LDL GOAL <130: ICD-10-CM

## 2018-08-29 DIAGNOSIS — Z12.11 SPECIAL SCREENING FOR MALIGNANT NEOPLASMS, COLON: ICD-10-CM

## 2018-08-29 DIAGNOSIS — E03.9 HYPOTHYROIDISM, UNSPECIFIED TYPE: ICD-10-CM

## 2018-08-29 DIAGNOSIS — E87.6 HYPOKALEMIA: ICD-10-CM

## 2018-08-29 PROCEDURE — 99214 OFFICE O/P EST MOD 30 MIN: CPT | Performed by: FAMILY MEDICINE

## 2018-08-29 RX ORDER — LEVOTHYROXINE SODIUM 50 UG/1
TABLET ORAL
Qty: 90 TABLET | Refills: 3 | Status: SHIPPED | OUTPATIENT
Start: 2018-08-29 | End: 2019-07-24

## 2018-08-29 RX ORDER — POTASSIUM CHLORIDE 1500 MG/1
20 TABLET, EXTENDED RELEASE ORAL DAILY
Qty: 90 TABLET | Refills: 3 | Status: SHIPPED | OUTPATIENT
Start: 2018-08-29 | End: 2019-12-03

## 2018-08-29 RX ORDER — METOPROLOL SUCCINATE 100 MG/1
TABLET, EXTENDED RELEASE ORAL
Qty: 90 TABLET | Refills: 3 | Status: SHIPPED | OUTPATIENT
Start: 2018-08-29 | End: 2019-07-24

## 2018-08-29 RX ORDER — HYDROCHLOROTHIAZIDE 25 MG/1
25 TABLET ORAL DAILY
Qty: 90 TABLET | Refills: 3 | Status: SHIPPED | OUTPATIENT
Start: 2018-08-29 | End: 2019-07-24

## 2018-08-29 NOTE — MR AVS SNAPSHOT
After Visit Summary   8/29/2018    Tara Charlton    MRN: [id number]           Patient Information     Date Of Birth          1965        Visit Information        Provider Department      8/29/2018 1:40 PM Dago Martinez MD [facility]        Today's Diagnoses     Essential hypertension    -  1    Hypothyroidism, unspecified type        Hyperlipidemia LDL goal <130        Hypokalemia        Special screening for malignant neoplasms, colon          Care Instructions    Please get a colonoscopy.    Please get a pap screen in the future and mammogram.    I refilled your medications.          Thank you for choosing [facility].  You may be receiving a survey in the mail from [facility] [facility] regarding your visit today.  Please take a few minutes to complete and return the survey to let us know how we are doing.      If you have questions or concerns, please contact us via [employer] or you can contact your care team at [phone].    Our Clinic hours are:  Monday 6:40 am  to 7:00 pm  Tuesday -Friday 6:40 am to 5:00 pm    The [place] outpatient lab hours are:  Monday - Friday 6:10 am to 4:45 pm  Saturdays 7:00 am to 11:00 am  Appointments are required, call [phone]    If you have clinical questions after hours or would like to schedule an appointment,  call the clinic at [phone].            Follow-ups after your visit        Additional Services     GASTROENTEROLOGY ADULT REF PROCEDURE ONLY       Last Lab Result: Creatinine (mg/dL)       Date                     Value                 08/28/2018               0.91             ----------  Body mass index is 29.76 kg/(m^2).     Needed:  No  Language:  English    Patient will be contacted to schedule procedure.     Please be aware that coverage of these services is subject to the terms and limitations of your health insurance plan.  Call member services at your health plan with any benefit or coverage questions.  Any  procedures must be performed at a [place] facility OR coordinated by your clinic's referral office.    Please bring the following with you to your appointment:    (1) Any X-Rays, CTs or MRIs which have been performed.  Contact the facility where they were done to arrange for  prior to your scheduled appointment.    (2) List of current medications   (3) This referral request   (4) Any documents/labs given to you for this referral                  Follow-up notes from your care team     Return in about 1 year (around 8/29/2019).      Who to contact     If you have questions or need follow up information about today's clinic visit or your schedule please contact [facility] directly at [phone].  Normal or non-critical lab and imaging results will be communicated to you by MyChart, letter or phone within 4 business days after the clinic has received the results. If you do not hear from us within 7 days, please contact the clinic through MyChart or phone. If you have a critical or abnormal lab result, we will notify you by phone as soon as possible.  Submit refill requests through [employer] or call your pharmacy and they will forward the refill request to us. Please allow 3 business days for your refill to be completed.          Additional Information About Your Visit        Care EveryWhere ID     This is your Care EveryWhere ID. This could be used by other organizations to access your [place] medical records  [id number]        Your Vitals Were     Pulse Temperature Pulse Oximetry BMI (Body Mass Index)          76 98.2  F (36.8  C) (Tympanic) 98% 29.76 kg/m2         Blood Pressure from Last 3 Encounters:   08/29/18 136/70   08/09/17 130/72   05/05/17 (!) 151/95    Weight from Last 3 Encounters:   08/29/18 172 lb (78 kg)   08/09/17 171 lb (77.6 kg)   12/28/16 163 lb (73.9 kg)              We Performed the Following     GASTROENTEROLOGY ADULT REF PROCEDURE ONLY          Where to get your  medicines      These medications were sent to [facility] - [address]  [address] [facility] [address], [facility] [address]     Phone:  [phone]     hydrochlorothiazide 25 MG tablet    levothyroxine 50 MCG tablet    metoprolol succinate 100 MG 24 hr tablet    potassium chloride SA 20 MEQ CR tablet          Primary Care Provider Office Phone # Fax #    Dago Martinez [phone] [address] [facility] [address]        Equal Access to Services     DARIEN ALONSO : Hadii aad ku hadasho Soomaali, waaxda luqadaha, qaybta kaalmada adeegyada, waxay idiin hayaan adeeg savanah palma . So [facility] [phone].    ATENCIÓN: Si habla español, tiene a palencia disposición servicios gratuitos de asistencia lingüística. [facility] [phone].    We comply with applicable federal civil rights laws and [place] laws. We do not discriminate on the basis of race, color, national origin, age, disability, sex, sexual orientation, or gender identity.            Thank you!     Thank you for choosing [facility]  for your care. Our goal is always to provide you with excellent care. Hearing back from our patients is one way we can continue to improve our services. Please take a few minutes to complete the written survey that you may receive in the mail after your visit with us. Thank you!             Your Updated Medication List - Protect others around you: Learn how to safely use, store and throw away your medicines at www.disposemymeds.org.          This list is accurate as of 8/29/18  2:14 PM.  Always use your most recent med list.                   Brand Name Dispense Instructions for use Diagnosis    hydrochlorothiazide 25 MG tablet    HYDRODIURIL    90 tablet    Take 1 tablet (25 mg) by mouth daily    Essential hypertension       levothyroxine 50 MCG tablet    SYNTHROID/LEVOTHROID    90 tablet    TAKE 1 TABLET BY MOUTH  DAILY    Hypothyroidism, unspecified  type       metoprolol succinate 100 MG 24 hr tablet    TOPROL-XL    90 tablet    TAKE 1 TABLET BY MOUTH  DAILY    Essential hypertension       potassium chloride SA 20 MEQ CR tablet    KLOR-CON    90 tablet    Take 1 tablet (20 mEq) by mouth daily    Hypokalemia

## 2018-08-29 NOTE — PROGRESS NOTES
SUBJECTIVE:   Tara KUO White is a 53 year old female who presents to clinic today for the following health issues:      Hyperlipidemia Follow-Up      Rate your low fat/cholesterol diet?: not monitoring fat    Taking statin?  Yes, no muscle aches from statin    Other lipid medications/supplements?:  none    Hypertension Follow-up      Outpatient blood pressures are not being checked.    Low Salt Diet: some added salt    Hypothyroidism Follow-up      Since last visit, patient describes the following symptoms: Weight stable, no hair loss, no skin changes, no constipation, no loose stools      Amount of exercise or physical activity: None    Problems taking medications regularly: No    Medication side effects: none    Diet: regular (no restrictions)            Problem list and histories reviewed & adjusted, as indicated.  Additional history: as documented        Reviewed and updated as needed this visit by clinical staff       Reviewed and updated as needed this visit by Provider         ROS:  CONSTITUTIONAL:NEGATIVE for fever, chills, change in weight  INTEGUMENTARY/SKIN: NEGATIVE for worrisome rashes, moles or lesions  RESP:NEGATIVE for significant cough or SOB  CV: NEGATIVE for chest pain, palpitations or peripheral edema  GI: mentions intermittent diarrhea  MUSCULOSKELETAL: NEGATIVE for significant arthralgias or myalgia  NEURO: NEGATIVE for weakness, dizziness or paresthesias  PSYCHIATRIC: NEGATIVE for changes in mood or affect    OBJECTIVE:                                                    /70 (BP Location: Right arm, Patient Position: Chair, Cuff Size: Adult Large)  Pulse 76  Temp 98.2  F (36.8  C) (Tympanic)  Wt 172 lb (78 kg)  SpO2 98%  BMI 29.76 kg/m2  Body mass index is 29.76 kg/(m^2).  GENERAL APPEARANCE: alert, no distress and cooperative  RESP: lungs clear to auscultation - no rales, rhonchi or wheezes  CV: regular rates and rhythm, normal S1 S2, no S3 or S4 and no murmur, click or  rub  ABDOMEN: soft, nontender, without hepatosplenomegaly or masses and bowel sounds normal  MS: extremities normal- no gross deformities noted  SKIN: no suspicious lesions or rashes  NEURO: Normal strength and tone, mentation intact and speech normal  PSYCH: mentation appears normal and affect normal/bright    Results for orders placed or performed in visit on 08/28/18   Basic metabolic panel  (Ca, Cl, CO2, Creat, Gluc, K, Na, BUN)   Result Value Ref Range    Sodium 143 133 - 144 mmol/L    Potassium 3.5 3.4 - 5.3 mmol/L    Chloride 103 94 - 109 mmol/L    Carbon Dioxide 29 20 - 32 mmol/L    Anion Gap 11 3 - 14 mmol/L    Glucose 139 (H) 70 - 99 mg/dL    Urea Nitrogen 18 7 - 30 mg/dL    Creatinine 0.91 0.52 - 1.04 mg/dL    GFR Estimate 64 >60 mL/min/1.7m2    GFR Estimate If Black 78 >60 mL/min/1.7m2    Calcium 9.2 8.5 - 10.1 mg/dL     Also reviewed other labs     ASSESSMENT/PLAN:                                                    1. Essential hypertension  Controlled and refilled med  - hydrochlorothiazide (HYDRODIURIL) 25 MG tablet; Take 1 tablet (25 mg) by mouth daily  Dispense: 90 tablet; Refill: 3  - metoprolol succinate (TOPROL-XL) 100 MG 24 hr tablet; TAKE 1 TABLET BY MOUTH  DAILY  Dispense: 90 tablet; Refill: 3    2. Hypothyroidism, unspecified type  Controlled and refilled med  - levothyroxine (SYNTHROID/LEVOTHROID) 50 MCG tablet; TAKE 1 TABLET BY MOUTH  DAILY  Dispense: 90 tablet; Refill: 3    3. Hyperlipidemia LDL goal <130  Needs to work on increase activity and diet    4. Hypokalemia    - potassium chloride SA (KLOR-CON) 20 MEQ CR tablet; Take 1 tablet (20 mEq) by mouth daily  Dispense: 90 tablet; Refill: 3    5. Special screening for malignant neoplasms, colon    - GASTROENTEROLOGY ADULT REF PROCEDURE ONLY      See Patient Instructions    Dago Martinez MD  [facility]

## 2018-08-29 NOTE — NURSING NOTE
"Initial /70 (BP Location: Right arm, Patient Position: Chair, Cuff Size: Adult Large)  Pulse 76  Temp 98.2  F (36.8  C) (Tympanic)  Wt 172 lb (78 kg)  SpO2 98%  BMI 29.76 kg/m2 Estimated body mass index is 29.76 kg/(m^2) as calculated from the following:    Height as of 8/9/17: 5' 3.75\" (1.619 m).    Weight as of this encounter: 172 lb (78 kg). .    Cathryn Mitchell    "

## 2018-08-29 NOTE — PATIENT INSTRUCTIONS
Please get a colonoscopy.    Please get a pap screen in the future and mammogram.    I refilled your medications.          Thank you for choosing [facility].  You may be receiving a survey in the mail from [employer] regarding your visit today.  Please take a few minutes to complete and return the survey to let us know how we are doing.      If you have questions or concerns, please contact us via [employer] or you can contact your care team at [phone].    Our Clinic hours are:  Monday 6:40 am  to 7:00 pm  Tuesday -Friday 6:40 am to 5:00 pm    The [place] outpatient lab hours are:  Monday - Friday 6:10 am to 4:45 pm  Saturdays 7:00 am to 11:00 am  Appointments are required, call [phone]    If you have clinical questions after hours or would like to schedule an appointment,  call the clinic at [phone].

## 2018-08-30 ENCOUNTER — TELEPHONE (OUTPATIENT)
Dept: FAMILY MEDICINE | Facility: CLINIC | Age: 53
End: 2018-08-30

## 2018-08-30 NOTE — TELEPHONE ENCOUNTER
Pot chloride 20MEQ tablets are available in 2 distinct forms and they are not interchangeable.  Please note:  Klor Con is not available as 20meq, however, Klor Con M is .  Please choose between Potassium CL TB 20meq CR WM or Potassium chloride CL TB 20meq SR MC.

## 2018-08-31 NOTE — TELEPHONE ENCOUNTER
Please call pharmacy to choose the first one on the list that they sent.  Potassium CL TB 20 meq CR WM taking one tab a day, #90 and refill times 3.  Dago Martinez MD  Family Medicine

## 2018-10-29 ENCOUNTER — HEALTH MAINTENANCE LETTER (OUTPATIENT)
Age: 53
End: 2018-10-29

## 2019-02-14 ENCOUNTER — TELEPHONE (OUTPATIENT)
Dept: FAMILY MEDICINE | Facility: CLINIC | Age: 54
End: 2019-02-14

## 2019-02-14 NOTE — LETTER
[facility]  [address]  [facility] [address]  Phone: [phone]    February 14, 2019      Tara Charlton  [address]  [place] [address]      Dear Tara:    As part of [facility]'s commitment to health and wellness, we inform our patient when records indicate the need for specific health screening.  Please review the following health screening recommendations based on your age:    Ages 40-75:  Annual physical that includes a breast exam, pelvic exam and possibly a pap smear and other lab work.  You also need to obtain a screening mammogram every year starting at age 40. At age 50, it is recommended that you be screened for colon cancer with a colonoscopy.  If your initial colonoscopy is negative, you probably won't need another one for 10 years.    Please bring a list of your current medications with you to your appointment.  If you will need refills prior to your appointment, please have your pharmacy fax a refill request to the appropriate provider; this helps to ensure that the correct medication and dose are ordered.    To schedule an appointment with a [facility] physician or nurse practitioner, please call:    [facility]            [phone]    [facility]           [phone]    [facility] Diagnostic Department           Family Practice Clinic . . . . . . [phone]                  (To Schedule a Mammogram)           Internal Medicine Clinic. . . . .  [phone]              OB/Gyn Clinic . . . . . . . . . . .  [phone]           Specialty Clinic . . . . . . . . . .  [phone]        Sincerely,      LUIS ENRIQUE Maldonado for  Dr. Lobito Martinez

## 2019-02-14 NOTE — TELEPHONE ENCOUNTER
Panel Management Review    Composite cancer screening  Chart review shows that this patient is due/due soon for the following Pap Smear and Colonoscopy  Summary:    Patient is due/failing the following:   COLONOSCOPY and PAP    Action needed:   Patient needs office visit for pap.    Type of outreach:    Sent letter.    Questions for provider review:    None                                                                                                                                    CYNTHIA Maldonado ([facility])

## 2019-07-24 ENCOUNTER — TELEPHONE (OUTPATIENT)
Dept: FAMILY MEDICINE | Facility: CLINIC | Age: 54
End: 2019-07-24

## 2019-07-24 DIAGNOSIS — E03.9 HYPOTHYROIDISM, UNSPECIFIED TYPE: ICD-10-CM

## 2019-07-24 DIAGNOSIS — I10 ESSENTIAL HYPERTENSION: ICD-10-CM

## 2019-07-25 NOTE — TELEPHONE ENCOUNTER
Left message for patient to return call to clinic.  These RXs were filled 1 year 8-28-18, so she has enough until end of August.  She is due for appt August, please help schedule appt before she is out.  Dimple LOPEZ RN

## 2019-07-26 NOTE — TELEPHONE ENCOUNTER
Message left for patient to return call to clinic.  CSS - ok to deliver message below.    Dimple LOPEZ RN

## 2019-07-30 RX ORDER — HYDROCHLOROTHIAZIDE 25 MG/1
TABLET ORAL
Qty: 30 TABLET | Refills: 0 | Status: SHIPPED | OUTPATIENT
Start: 2019-07-30 | End: 2019-12-03

## 2019-07-30 RX ORDER — METOPROLOL SUCCINATE 100 MG/1
TABLET, EXTENDED RELEASE ORAL
Qty: 30 TABLET | Refills: 0 | Status: SHIPPED | OUTPATIENT
Start: 2019-07-30 | End: 2019-11-18

## 2019-07-30 RX ORDER — LEVOTHYROXINE SODIUM 50 UG/1
TABLET ORAL
Qty: 30 TABLET | Refills: 0 | Status: SHIPPED | OUTPATIENT
Start: 2019-07-30 | End: 2019-11-18

## 2019-07-30 NOTE — TELEPHONE ENCOUNTER
Message left for patient to check the pharmacy.  Office visit required for further refills. Nat HINES RN

## 2019-07-31 ENCOUNTER — TELEPHONE (OUTPATIENT)
Dept: FAMILY MEDICINE | Facility: CLINIC | Age: 54
End: 2019-07-31

## 2019-07-31 NOTE — LETTER
[facility] - FAMILY PRACT[facility]  [address]  [facility] [address]  Phone: [phone]    July 31, 2019      Tara Charlton  [address]  [facility] [address]      Dear Tara:    As part of [facility]'s commitment to health and wellness, we inform our patient when records indicate the need for specific health screening.  Please review the following health screening recommendations based on your age:    Ages 40-75:  Annual physical that includes a breast exam, pelvic exam and possibly a pap smear and other lab work.  You also need to obtain a screening mammogram every year starting at age 40. At age 50, it is recommended that you be screened for colon cancer with a colonoscopy.  If your initial colonoscopy is negative, you probably won't need another one for 10 years.    Please bring a list of your current medications with you to your appointment.  If you will need refills prior to your appointment, please have your pharmacy fax a refill request to the appropriate provider; this helps to ensure that the correct medication and dose are ordered.    To schedule an appointment with a [facility] physician or nurse practitioner, please call:    [facility]            [phone]    [facility]           [phone]    [facility]           Family Practice Clinic . . . . . . [phone]                  (To Schedule a Mammogram)           Internal Medicine Clinic. . . . .  [phone]                            [phone]             OB/Gyn Clinic . . . . . . . . . . .  [phone]           Specialty Clinic . . . . . . . . . .  [phone]      Sincerely,      CYNTHIA Cline ([facility]) for   Dr. Lobito Martinez

## 2019-07-31 NOTE — TELEPHONE ENCOUNTER
Panel Management Review          Composite cancer screening  Chart review shows that this patient is due/due soon for the following Pap Smear, Mammogram and Colonoscopy  Summary:    Patient is due/failing the following:   COLONOSCOPY, MAMMOGRAM and PAP    Action needed:   Needs office visit    Type of outreach:    Sent letter.    Questions for provider review:    None                                                                                                                                    CYNTHIA Maldonado ([facility])

## 2019-11-03 ENCOUNTER — TELEPHONE (OUTPATIENT)
Dept: FAMILY MEDICINE | Facility: CLINIC | Age: 54
End: 2019-11-03

## 2019-11-03 DIAGNOSIS — I10 ESSENTIAL HYPERTENSION: Primary | ICD-10-CM

## 2019-11-03 DIAGNOSIS — E03.9 HYPOTHYROIDISM, UNSPECIFIED TYPE: ICD-10-CM

## 2019-11-03 DIAGNOSIS — E78.5 HYPERLIPIDEMIA LDL GOAL <130: ICD-10-CM

## 2019-11-03 NOTE — TELEPHONE ENCOUNTER
Reason for Call:  Other call back    Detailed comments: patient would like to know if she need any labs done for her medication check. Please follow up with patient.     Phone Number Patient can be reached at: Home number on file [phone] (home)    Best Time: anytime     Can we leave a detailed message on this number? YES    Call taken on 11/3/2019 at 2:42 PM by Evelyn Alba

## 2019-11-04 NOTE — TELEPHONE ENCOUNTER
Patient has appt 12/3/19 for med check  Patient is requesting labs for this appt  Cued labs please add any others you advise to have done.    Routing to provider.    Maryanne LOPEZ Rn

## 2019-11-05 NOTE — TELEPHONE ENCOUNTER
Left detailed message as previously requested, call back if further questions.      Dulce CARNEY BSN, RN

## 2019-11-14 ENCOUNTER — NURSE TRIAGE (OUTPATIENT)
Dept: NURSING | Facility: CLINIC | Age: 54
End: 2019-11-14

## 2019-11-14 NOTE — TELEPHONE ENCOUNTER
Patient says she has ordered labs and want to know if she needs to fast.   FNA advised she should fast for the lipid panel but the other ones she can get it done without fasting.  Caller verbalizes understanding and says with her work schedule, she may have to do the non-fasting labs first.    Reason for Disposition    Health Information question, no triage required and triager able to answer question    Additional Information    Negative: [1] Caller is not with the adult (patient) AND [2] reporting urgent symptoms    Negative: Lab result questions    Negative: Medication questions    Negative: Caller can't be reached by phone    Negative: Caller has already spoken to PCP or another triager    Negative: RN needs further essential information from caller in order to complete triage    Negative: Requesting regular office appointment    Negative: [1] Caller requesting NON-URGENT health information AND [2] PCP's office is the best resource    Protocols used: INFORMATION ONLY CALL-A-

## 2019-11-17 ENCOUNTER — NURSE TRIAGE (OUTPATIENT)
Dept: NURSING | Facility: CLINIC | Age: 54
End: 2019-11-17

## 2019-11-17 ENCOUNTER — TELEPHONE (OUTPATIENT)
Dept: FAMILY MEDICINE | Facility: CLINIC | Age: 54
End: 2019-11-17

## 2019-11-17 DIAGNOSIS — I10 ESSENTIAL HYPERTENSION: ICD-10-CM

## 2019-11-17 DIAGNOSIS — E03.9 HYPOTHYROIDISM, UNSPECIFIED TYPE: ICD-10-CM

## 2019-11-17 NOTE — TELEPHONE ENCOUNTER
Clinic Action Needed:  Yes, refill  FNA Triage Call  Presenting Problem:    Tara is requesting a refill for Metoprolol and Levothyroxine.  Please refill.  Tara has an appointment scheduled for December 3rd.  Please refill as she will be out Tuesday.      Routed to:  RN Pool    Please be sure to close this encounter once this patient's issue/question has been addressed.    Antonieta Burns RN/[facility]

## 2019-11-17 NOTE — TELEPHONE ENCOUNTER
Tara is requesting a refill for Metoprolol and Levothyroxine.  Please refill.  Tara has an appointment scheduled for December 3rd.  Please refill as she will be out Tuesday.

## 2019-11-18 RX ORDER — LEVOTHYROXINE SODIUM 50 UG/1
50 TABLET ORAL DAILY
Qty: 30 TABLET | Refills: 0 | Status: SHIPPED | OUTPATIENT
Start: 2019-11-18 | End: 2019-12-03 | Stop reason: DRUGHIGH

## 2019-11-18 RX ORDER — METOPROLOL SUCCINATE 100 MG/1
100 TABLET, EXTENDED RELEASE ORAL DAILY
Qty: 30 TABLET | Refills: 0 | Status: SHIPPED | OUTPATIENT
Start: 2019-11-18 | End: 2019-12-03

## 2019-11-18 NOTE — TELEPHONE ENCOUNTER
Left message for patient to check pharmacy, keep upcoming appt with provider, call the [facility] clinic back with questions or concerns.    Maryanne LOPEZ Rn

## 2019-11-18 NOTE — TELEPHONE ENCOUNTER
Please see note below  Routing refill request to provider for review/approval because:  Lizbeth given x1 and patient did not follow up, please advise- Levothyroxine and Metoprolol   Patient does have upcoming appt 12/3/19    Routing to provider.    Maryanne LOPEZ Rn

## 2019-11-18 NOTE — TELEPHONE ENCOUNTER
I read the note and she was going to be out on Tuesday so I sent a month supply to the [facility] here.  Please notify the patient.  Sincerely,  Dago Martinez MD

## 2019-11-26 DIAGNOSIS — E78.5 HYPERLIPIDEMIA LDL GOAL <130: ICD-10-CM

## 2019-11-26 DIAGNOSIS — I10 ESSENTIAL HYPERTENSION: ICD-10-CM

## 2019-11-26 DIAGNOSIS — E03.9 HYPOTHYROIDISM, UNSPECIFIED TYPE: ICD-10-CM

## 2019-11-26 LAB
ANION GAP SERPL CALCULATED.3IONS-SCNC: 5 MMOL/L (ref 3–14)
BUN SERPL-MCNC: 16 MG/DL (ref 7–30)
CALCIUM SERPL-MCNC: 9.6 MG/DL (ref 8.5–10.1)
CHLORIDE SERPL-SCNC: 99 MMOL/L (ref 94–109)
CHOLEST SERPL-MCNC: 241 MG/DL
CO2 SERPL-SCNC: 31 MMOL/L (ref 20–32)
CREAT SERPL-MCNC: 1.04 MG/DL (ref 0.52–1.04)
GFR SERPL CREATININE-BSD FRML MDRD: 60 ML/MIN/{1.73_M2}
GLUCOSE SERPL-MCNC: 109 MG/DL (ref 70–99)
HDLC SERPL-MCNC: 64 MG/DL
LDLC SERPL CALC-MCNC: 148 MG/DL
NONHDLC SERPL-MCNC: 177 MG/DL
POTASSIUM SERPL-SCNC: 3.1 MMOL/L (ref 3.4–5.3)
SODIUM SERPL-SCNC: 135 MMOL/L (ref 133–144)
T4 FREE SERPL-MCNC: 1.17 NG/DL (ref 0.76–1.46)
TRIGL SERPL-MCNC: 145 MG/DL
TSH SERPL DL<=0.005 MIU/L-ACNC: 4.69 MU/L (ref 0.4–4)

## 2019-11-26 PROCEDURE — 80048 BASIC METABOLIC PNL TOTAL CA: CPT | Performed by: FAMILY MEDICINE

## 2019-11-26 PROCEDURE — 84439 ASSAY OF FREE THYROXINE: CPT | Performed by: FAMILY MEDICINE

## 2019-11-26 PROCEDURE — 80061 LIPID PANEL: CPT | Performed by: FAMILY MEDICINE

## 2019-11-26 PROCEDURE — 84443 ASSAY THYROID STIM HORMONE: CPT | Performed by: FAMILY MEDICINE

## 2019-11-26 PROCEDURE — 36415 COLL VENOUS BLD VENIPUNCTURE: CPT | Performed by: FAMILY MEDICINE

## 2019-12-03 ENCOUNTER — OFFICE VISIT (OUTPATIENT)
Dept: FAMILY MEDICINE | Facility: CLINIC | Age: 54
End: 2019-12-03
Payer: COMMERCIAL

## 2019-12-03 VITALS
OXYGEN SATURATION: 96 % | HEART RATE: 108 BPM | SYSTOLIC BLOOD PRESSURE: 120 MMHG | BODY MASS INDEX: 29.41 KG/M2 | TEMPERATURE: 99.1 F | HEIGHT: 63 IN | DIASTOLIC BLOOD PRESSURE: 70 MMHG | WEIGHT: 166 LBS

## 2019-12-03 DIAGNOSIS — Z72.0 TOBACCO ABUSE: ICD-10-CM

## 2019-12-03 DIAGNOSIS — E03.9 HYPOTHYROIDISM, UNSPECIFIED TYPE: Primary | ICD-10-CM

## 2019-12-03 DIAGNOSIS — E87.6 HYPOKALEMIA: ICD-10-CM

## 2019-12-03 DIAGNOSIS — I10 ESSENTIAL HYPERTENSION: ICD-10-CM

## 2019-12-03 DIAGNOSIS — L72.3 SEBACEOUS CYST OF LEFT AXILLA: ICD-10-CM

## 2019-12-03 PROCEDURE — 99214 OFFICE O/P EST MOD 30 MIN: CPT | Performed by: FAMILY MEDICINE

## 2019-12-03 RX ORDER — HYDROCHLOROTHIAZIDE 25 MG/1
25 TABLET ORAL DAILY
Qty: 90 TABLET | Refills: 3 | Status: SHIPPED | OUTPATIENT
Start: 2019-12-03 | End: 2020-10-27

## 2019-12-03 RX ORDER — LEVOTHYROXINE SODIUM 75 UG/1
75 TABLET ORAL DAILY
Qty: 90 TABLET | Refills: 3 | Status: SHIPPED | OUTPATIENT
Start: 2019-12-03 | End: 2020-10-27

## 2019-12-03 RX ORDER — VARENICLINE TARTRATE 1 MG/1
1 TABLET, FILM COATED ORAL 2 TIMES DAILY
Qty: 60 TABLET | Refills: 2 | Status: SHIPPED | OUTPATIENT
Start: 2019-12-03 | End: 2022-03-02

## 2019-12-03 RX ORDER — POTASSIUM CHLORIDE 1500 MG/1
20 TABLET, EXTENDED RELEASE ORAL DAILY
Qty: 90 TABLET | Refills: 3 | Status: SHIPPED | OUTPATIENT
Start: 2019-12-03 | End: 2022-03-02

## 2019-12-03 RX ORDER — METOPROLOL SUCCINATE 100 MG/1
100 TABLET, EXTENDED RELEASE ORAL DAILY
Qty: 90 TABLET | Refills: 3 | Status: SHIPPED | OUTPATIENT
Start: 2019-12-03 | End: 2020-10-27

## 2019-12-03 ASSESSMENT — MIFFLIN-ST. JEOR: SCORE: 1316.06

## 2019-12-03 NOTE — PROGRESS NOTES
Subjective     Tara Charlton is a 54 year old female who presents to clinic today for the following health issues:  Chief Complaint   Patient presents with     Medication Refill     renewal of all meds     Cyst     has a cyst in left axillary region. Has had it since October. Got better when she was on antibiotic for pneumonia.     She is here to review labs and get refills.  Component      Latest Ref Rng & Units 11/26/2019   Sodium      133 - 144 mmol/L 135   Potassium      3.4 - 5.3 mmol/L 3.1 (L)   Chloride      94 - 109 mmol/L 99   Carbon Dioxide      20 - 32 mmol/L 31   Anion Gap      3 - 14 mmol/L 5   Glucose      70 - 99 mg/dL 109 (H)   Urea Nitrogen      7 - 30 mg/dL 16   Creatinine      0.52 - 1.04 mg/dL 1.04   GFR Estimate      >60 mL/min/1.73:m2 60 (L)   GFR Estimate If Black      >60 mL/min/1.73:m2 69   Calcium      8.5 - 10.1 mg/dL 9.6   Cholesterol      <200 mg/dL 241 (H)   Triglycerides      <150 mg/dL 145   HDL Cholesterol      >49 mg/dL 64   LDL Cholesterol Calculated      <100 mg/dL 148 (H)   Non HDL Cholesterol      <130 mg/dL 177 (H)   TSH      0.40 - 4.00 mU/L 4.69 (H)   T4 Free      0.76 - 1.46 ng/dL 1.17     HPI   Hypertension Follow-up      Do you check your blood pressure regularly outside of the clinic? No     Are you following a low salt diet? sometimes    Are your blood pressures ever more than 140 on the top number (systolic) OR more   than 90 on the bottom number (diastolic), for example 140/90? No    Hypothyroidism Follow-up      Since last visit, patient describes the following symptoms: Weight stable, no hair loss, no skin changes, no constipation, no loose stools      How many servings of fruits and vegetables do you eat daily?  0-1    On average, how many sweetened beverages do you drink each day (Examples: soda, juice, sweet tea, etc.  Do NOT count diet or artificially sweetened beverages)?   1    How many days per week do you miss taking your medication? 0              Reviewed  "and updated as needed this visit by Provider  Tobacco  Allergies  Meds  Problems  Med Hx  Surg Hx  Fam Hx         Review of Systems   Review Of Systems  Skin: has recurrent draining cyst in left axilla for 3 months.  Better when she was on antibiotic for a respiratory issue.  Eyes:   Ears/Nose/Throat:   Respiratory: has intermittent cough, still smoking. Considering to quit again  Cardiovascular: negative  Gastrointestinal:   Genitourinary:   Musculoskeletal:   Neurologic:   Psychiatric:   Hematologic/Lymphatic/Immunologic:   Endocrine:         Objective    /70   Pulse 108   Temp 99.1  F (37.3  C) (Tympanic)   Ht 1.607 m (5' 3.25\")   Wt 75.3 kg (166 lb)   LMP 03/12/2011   SpO2 96%   BMI 29.17 kg/m    Body mass index is 29.17 kg/m .  Physical Exam   GENERAL APPEARANCE: alert, no distress and cooperative  RESP: lungs clear to auscultation - no rales, rhonchi or wheezes  CV: regular rates and rhythm, normal S1 S2, no S3 or S4 and no murmur, click or rub  ABDOMEN: soft, nontender, without hepatosplenomegaly or masses and bowel sounds normal  MS: extremities normal- no gross deformities noted  SKIN: left axilla has a mass, firm, not draining today, seems to be sebaceous cyst   NEURO: Normal strength and tone, mentation intact and speech normal  PSYCH: mentation appears normal and affect normal/bright    Explained the labs        Assessment & Plan     (E03.9) Hypothyroidism, unspecified type  (primary encounter diagnosis)  Comment: increase medications  Plan: levothyroxine (SYNTHROID/LEVOTHROID) 75 MCG         tablet, TSH, T4 FREE            (E87.6) Hypokalemia  Comment: need to recheck and start potassium  Plan: potassium chloride ER (KLOR-CON) 20 MEQ CR         tablet, Basic metabolic panel            (I10) Essential hypertension  Comment: controlled  Plan: Basic metabolic panel, hydrochlorothiazide         (HYDRODIURIL) 25 MG tablet, metoprolol         succinate ER (TOPROL-XL) 100 MG 24 hr tablet    " "        (Z72.0) Tobacco abuse  Comment: discussed quitting and how to use medication  Plan: varenicline (CHANTIX LEE) 0.5 MG X 11 & 1 MG X         42 tablet, varenicline (CHANTIX) 1 MG tablet            (L72.3) Sebaceous cyst of left axilla  Comment: referral done due to recurrent infections  Plan: DERMATOLOGY REFERRAL               Tobacco Cessation:   reports that she has been smoking cigarettes. She has been smoking about 0.50 packs per day. She has never used smokeless tobacco.  Tobacco Cessation Action Plan: Pharmacotherapies : Chantix      BMI:   Estimated body mass index is 29.17 kg/m  as calculated from the following:    Height as of this encounter: 1.607 m (5' 3.25\").    Weight as of this encounter: 75.3 kg (166 lb).   Weight management plan: diet        See Patient Instructions    Return in about 2 months (around 2/3/2020) for Lab Work.    Dago Martinez MD  [facility]      "

## 2019-12-03 NOTE — PATIENT INSTRUCTIONS
Please see the dermatologist for the recurrent cyst in left axillary region.    Please increase your levothyroxine to 75 mcg a day.  Recheck your lab in 2 months.    Please take your potassium chloride 20 meq a day.  Recheck your potassium level in 2 months.    I refilled your other medication.

## 2020-02-11 ENCOUNTER — TELEPHONE (OUTPATIENT)
Dept: FAMILY MEDICINE | Facility: CLINIC | Age: 55
End: 2020-02-11

## 2020-05-14 ENCOUNTER — OFFICE VISIT (OUTPATIENT)
Dept: DERMATOLOGY | Facility: CLINIC | Age: 55
End: 2020-05-14
Payer: COMMERCIAL

## 2020-05-14 VITALS — OXYGEN SATURATION: 97 % | SYSTOLIC BLOOD PRESSURE: 144 MMHG | DIASTOLIC BLOOD PRESSURE: 83 MMHG | HEART RATE: 106 BPM

## 2020-05-14 DIAGNOSIS — L02.92 CARBUNCLE AND FURUNCLE: ICD-10-CM

## 2020-05-14 DIAGNOSIS — L02.93 CARBUNCLE: Primary | ICD-10-CM

## 2020-05-14 DIAGNOSIS — L02.93 CARBUNCLE AND FURUNCLE: ICD-10-CM

## 2020-05-14 PROCEDURE — 99203 OFFICE O/P NEW LOW 30 MIN: CPT | Performed by: PHYSICIAN ASSISTANT

## 2020-05-14 RX ORDER — DOXYCYCLINE 100 MG/1
CAPSULE ORAL
Qty: 60 CAPSULE | Refills: 1 | Status: SHIPPED | OUTPATIENT
Start: 2020-05-14 | End: 2020-05-14

## 2020-05-14 RX ORDER — CLINDAMYCIN PHOSPHATE 10 MG/G
GEL TOPICAL
Qty: 60 G | Refills: 5 | Status: SHIPPED | OUTPATIENT
Start: 2020-05-14 | End: 2020-05-14

## 2020-05-14 RX ORDER — CLINDAMYCIN PHOSPHATE 10 MG/G
GEL TOPICAL
Qty: 60 G | Refills: 5 | Status: SHIPPED | OUTPATIENT
Start: 2020-05-14 | End: 2022-03-02

## 2020-05-14 RX ORDER — DOXYCYCLINE 100 MG/1
CAPSULE ORAL
Qty: 60 CAPSULE | Refills: 1 | Status: SHIPPED | OUTPATIENT
Start: 2020-05-14 | End: 2022-03-02

## 2020-05-14 NOTE — LETTER
5/14/2020         RE: Tara Charlton  [address]  [place] [address]        Dear Colleague,    Thank you for referring your patient, Tara Charlton, to the [facility]. Please see a copy of my visit note below.    HPI:   Chief complaints: Tara Charlton is a 54 year old female who presents for evaluation of a spots in the armpits. They have been present for about 1 month and are painful. Have drained purulent drainage in the past. She has not tried any treatments for them.    Condition present for:  1 month.   Previous treatments include: none    Review Of Systems  Eyes: negative  Ears/Nose/Throat: negative  Respiratory: No shortness of breath, dyspnea on exertion, cough, or hemoptysis  Cardiovascular: negative  Gastrointestinal: negative  Genitourinary: negative  Musculoskeletal: negative  Neurologic: negative  Psychiatric: negative  Skin: positive for lesions      PHYSICAL EXAM:    BP (!) 144/83   Pulse 106   LMP 03/12/2011   SpO2 97%   Skin exam performed as follows: Type 2 skin. Mood appropriate  Alert and Oriented X 3. Well developed, well nourished in no distress.  General appearance: Normal  Head including face: Normal  Eyes: conjunctiva and lids: Normal  Mouth: Lips, teeth, gums: Normal  Neck: Normal  Chest-breast/axillae: Normal  Back: Normal  Spleen and liver: Normal  Cardiovascular: Exam of peripheral vascular system by observation for swelling, varicosities, edema: Normal  Genitalia: groin, buttocks: Normal  Extremities: digits/nails (clubbing): Normal  Eccrine and Apocrine glands: Normal  Right upper extremity: Normal  Left upper extremity: Normal  Right lower extremity: Normal  Left lower extremity: Normal  Skin: Scalp and body hair: See below    1. Perifollicular papules, larger indurated cyst in bilateral axillae    ASSESSMENT/PLAN:     1. Carbuncles and furuncles in axillae - no lesions to culture today. Advised if areas become purulent to contact clinic for  culture.   --Start doxycycline 100 mg BID x 1-2 months. Advised to take with food. Discussed risk of GI upset, esophagitis and photosensitivity.   --Start OTC Hibiclens every day in the shower  --Clindamycin gel every day until resolved  2. Tara to follow up with Primary Care provider regarding elevated blood pressure.        Follow-up: 1 month if not better  CC:   Scribed By: Lala Heath, MS, PA-C        Again, thank you for allowing me to participate in the care of your patient.        Sincerely,        Lala Heath PA-C

## 2020-05-14 NOTE — PROGRESS NOTES
HPI:   Chief complaints: Tara Charlton is a 54 year old female who presents for evaluation of a spots in the armpits. They have been present for about 1 month and are painful. Have drained purulent drainage in the past. She has not tried any treatments for them.    Condition present for:  1 month.   Previous treatments include: none    Review Of Systems  Eyes: negative  Ears/Nose/Throat: negative  Respiratory: No shortness of breath, dyspnea on exertion, cough, or hemoptysis  Cardiovascular: negative  Gastrointestinal: negative  Genitourinary: negative  Musculoskeletal: negative  Neurologic: negative  Psychiatric: negative  Skin: positive for lesions      PHYSICAL EXAM:    BP (!) 144/83   Pulse 106   LMP 03/12/2011   SpO2 97%   Skin exam performed as follows: Type 2 skin. Mood appropriate  Alert and Oriented X 3. Well developed, well nourished in no distress.  General appearance: Normal  Head including face: Normal  Eyes: conjunctiva and lids: Normal  Mouth: Lips, teeth, gums: Normal  Neck: Normal  Chest-breast/axillae: Normal  Back: Normal  Spleen and liver: Normal  Cardiovascular: Exam of peripheral vascular system by observation for swelling, varicosities, edema: Normal  Genitalia: groin, buttocks: Normal  Extremities: digits/nails (clubbing): Normal  Eccrine and Apocrine glands: Normal  Right upper extremity: Normal  Left upper extremity: Normal  Right lower extremity: Normal  Left lower extremity: Normal  Skin: Scalp and body hair: See below    1. Perifollicular papules, larger indurated cyst in bilateral axillae    ASSESSMENT/PLAN:     1. Carbuncles and furuncles in axillae - no lesions to culture today. Advised if areas become purulent to contact clinic for culture.   --Start doxycycline 100 mg BID x 1-2 months. Advised to take with food. Discussed risk of GI upset, esophagitis and photosensitivity.   --Start OTC Hibiclens every day in the shower  --Clindamycin gel every day until resolved  2. Tara kamila  follow up with Primary Care provider regarding elevated blood pressure.        Follow-up: 1 month if not better  CC:   Scribed By: Lala Heath MS, PA-C

## 2020-05-14 NOTE — PATIENT INSTRUCTIONS
For the armpits     Wash armpits, groin and belly button once daily in the shower with OTC Hibiclens    Apply clindamycin gel to armpits and groin once daily until better    Start doxycycline (oral antibiotic) twice per day with food and full glass of water for 1 month (two if you aren't totally better), then stop. You will be more sun sensitive so wear sunscreen SPF 30 or higher every day.     If things are getting worse, let me know and we can culture one of the spots.

## 2020-05-14 NOTE — NURSING NOTE
"Initial BP (!) 144/83   Pulse 106   LMP 03/12/2011   SpO2 97%  Estimated body mass index is 29.17 kg/m  as calculated from the following:    Height as of 12/3/19: 1.607 m (5' 3.25\").    Weight as of 12/3/19: 75.3 kg (166 lb). .    "

## 2020-10-08 DIAGNOSIS — I10 ESSENTIAL HYPERTENSION: ICD-10-CM

## 2020-10-08 DIAGNOSIS — E03.9 HYPOTHYROIDISM, UNSPECIFIED TYPE: ICD-10-CM

## 2020-10-09 RX ORDER — METOPROLOL SUCCINATE 100 MG/1
TABLET, EXTENDED RELEASE ORAL
Qty: 90 TABLET | Refills: 3 | OUTPATIENT
Start: 2020-10-09

## 2020-10-09 RX ORDER — LEVOTHYROXINE SODIUM 75 UG/1
TABLET ORAL
Qty: 90 TABLET | Refills: 3 | OUTPATIENT
Start: 2020-10-09

## 2020-10-09 RX ORDER — POTASSIUM CHLORIDE 1500 MG/1
TABLET, EXTENDED RELEASE ORAL
Qty: 90 TABLET | Refills: 3 | OUTPATIENT
Start: 2020-10-09

## 2020-10-09 RX ORDER — HYDROCHLOROTHIAZIDE 25 MG/1
TABLET ORAL
Qty: 90 TABLET | Refills: 3 | OUTPATIENT
Start: 2020-10-09

## 2020-10-09 NOTE — TELEPHONE ENCOUNTER
"Requested Prescriptions   Pending Prescriptions Disp Refills     potassium chloride ER (K-TAB) 20 MEQ CR tablet [Pharmacy Med Name: POTASSIUM  20MEQ CONTROLLED RELEASE TABLET  CR CHLORIDE WM TB] 90 tablet 3     Sig: TAKE 1 TABLET BY MOUTH  DAILY       Potassium Supplements Protocol Failed - 10/8/2020  8:48 PM        Failed - Medication is active on med list        Failed - Normal serum potassium in past 12 months     Recent Labs   Lab Test 11/26/19  0940   POTASSIUM 3.1*                    Passed - Recent (12 mo) or future (30 days) visit within the authorizing provider's department     Patient has had an office visit with the authorizing provider or a provider within the authorizing providers department within the previous 12 mos or has a future within next 30 days. See \"Patient Info\" tab in inbasket, or \"Choose Columns\" in Meds & Orders section of the refill encounter.              Passed - Patient is age 18 or older           hydrochlorothiazide (HYDRODIURIL) 25 MG tablet [Pharmacy Med Name: HYDROCHLOROTHIAZIDE  25MG  TAB] 90 tablet 3     Sig: TAKE 1 TABLET BY MOUTH  DAILY       Diuretics (Including Combos) Protocol Failed - 10/8/2020  8:48 PM        Failed - Blood pressure under 140/90 in past 12 months     BP Readings from Last 3 Encounters:   05/14/20 (!) 144/83   12/03/19 120/70   08/29/18 136/70                 Failed - Normal serum potassium on file in past 12 months     Recent Labs   Lab Test 11/26/19  0940   POTASSIUM 3.1*                    Passed - Recent (12 mo) or future (30 days) visit within the authorizing provider's specialty     Patient has had an office visit with the authorizing provider or a provider within the authorizing providers department within the previous 12 mos or has a future within next 30 days. See \"Patient Info\" tab in inbasket, or \"Choose Columns\" in Meds & Orders section of the refill encounter.              Passed - Medication is active on med list        Passed - Patient is " "age 18 or older        Passed - No active pregancy on record        Passed - Normal serum creatinine on file in past 12 months     Recent Labs   Lab Test 11/26/19  0940   CR 1.04              Passed - Normal serum sodium on file in past 12 months     Recent Labs   Lab Test 11/26/19  0940                 Passed - No positive pregnancy test in past 12 months           metoprolol succinate ER (TOPROL-XL) 100 MG 24 hr tablet [Pharmacy Med Name: METOPROLOL SUCC ER 100MG TABLET] 90 tablet 3     Sig: TAKE 1 TABLET BY MOUTH  DAILY       Beta-Blockers Protocol Failed - 10/8/2020  8:48 PM        Failed - Blood pressure under 140/90 in past 12 months     BP Readings from Last 3 Encounters:   05/14/20 (!) 144/83   12/03/19 120/70   08/29/18 136/70                 Passed - Patient is age 6 or older        Passed - Recent (12 mo) or future (30 days) visit within the authorizing provider's specialty     Patient has had an office visit with the authorizing provider or a provider within the authorizing providers department within the previous 12 mos or has a future within next 30 days. See \"Patient Info\" tab in [employer], or \"Choose Columns\" in Meds & Orders section of the refill encounter.              Passed - Medication is active on med list           levothyroxine (SYNTHROID/LEVOTHROID) 75 MCG tablet [Pharmacy Med Name: LEVOTHYROXINE  75MCG  TAB] 90 tablet 3     Sig: TAKE 1 TABLET BY MOUTH  DAILY       Thyroid Protocol Failed - 10/8/2020  8:48 PM        Failed - Normal TSH on file in past 12 months     Recent Labs   Lab Test 11/26/19  0940   TSH 4.69*              Passed - Patient is 12 years or older        Passed - Recent (12 mo) or future (30 days) visit within the authorizing provider's specialty     Patient has had an office visit with the authorizing provider or a provider within the authorizing providers department within the previous 12 mos or has a future within next 30 days. See \"Patient Info\" tab in [employer], or " "\"Choose Columns\" in Meds & Orders section of the refill encounter.              Passed - Medication is active on med list        Passed - No active pregnancy on record     If patient is pregnant or has had a positive pregnancy test, please check TSH.          Passed - No positive pregnancy test in past 12 months     If patient is pregnant or has had a positive pregnancy test, please check TSH.               "

## 2020-10-24 DIAGNOSIS — I10 ESSENTIAL HYPERTENSION: ICD-10-CM

## 2020-10-24 DIAGNOSIS — E03.9 HYPOTHYROIDISM, UNSPECIFIED TYPE: ICD-10-CM

## 2020-10-24 DIAGNOSIS — E87.6 HYPOKALEMIA: Primary | ICD-10-CM

## 2020-10-24 NOTE — LETTER
[facility]  [address] [address]  Phone: [phone]        October 27, 2020      Tara Charlton                                                                                                                                [id number]  [facility] [place] [address]            Dear Ms. Charlton,    We recently received medication refill requests for you.  This medication requires routine follow-up.      You are due for lab work and an RN blood pressure check.  We do not charge for the RN visits.    We cannot refill your medications until these things are completed.    Please call [phone] to arrange your lab and RN appts.    Thank you,        CLEOPATRA Lopez / luciano        lar

## 2020-10-26 NOTE — TELEPHONE ENCOUNTER
"Requested Prescriptions   Pending Prescriptions Disp Refills     potassium chloride ER (K-TAB) 20 MEQ CR tablet [Pharmacy Med Name: POTASSIUM  20MEQ CONTROLLED RELEASE TABLET  CR CHLORIDE WM TB] 90 tablet 3     Sig: TAKE 1 TABLET BY MOUTH  DAILY       Potassium Supplements Protocol Failed - 10/24/2020  8:44 PM        Failed - Medication is active on med list        Failed - Normal serum potassium in past 12 months     Recent Labs   Lab Test 11/26/19  0940   POTASSIUM 3.1*                    Passed - Recent (12 mo) or future (30 days) visit within the authorizing provider's department     Patient has had an office visit with the authorizing provider or a provider within the authorizing providers department within the previous 12 mos or has a future within next 30 days. See \"Patient Info\" tab in inbasket, or \"Choose Columns\" in Meds & Orders section of the refill encounter.              Passed - Patient is age 18 or older           levothyroxine (SYNTHROID/LEVOTHROID) 75 MCG tablet [Pharmacy Med Name: LEVOTHYROXINE  75MCG  TAB] 90 tablet 3     Sig: TAKE 1 TABLET BY MOUTH  DAILY       Thyroid Protocol Failed - 10/24/2020  8:44 PM        Failed - Normal TSH on file in past 12 months     Recent Labs   Lab Test 11/26/19  0940   TSH 4.69*              Passed - Patient is 12 years or older        Passed - Recent (12 mo) or future (30 days) visit within the authorizing provider's specialty     Patient has had an office visit with the authorizing provider or a provider within the authorizing providers department within the previous 12 mos or has a future within next 30 days. See \"Patient Info\" tab in inbasket, or \"Choose Columns\" in Meds & Orders section of the refill encounter.              Passed - Medication is active on med list        Passed - No active pregnancy on record     If patient is pregnant or has had a positive pregnancy test, please check TSH.          Passed - No positive pregnancy test in past 12 months     " "If patient is pregnant or has had a positive pregnancy test, please check TSH.             metoprolol succinate ER (TOPROL-XL) 100 MG 24 hr tablet [Pharmacy Med Name: METOPROLOL SUCC ER 100MG TABLET] 90 tablet 3     Sig: TAKE 1 TABLET BY MOUTH  DAILY       Beta-Blockers Protocol Failed - 10/24/2020  8:44 PM        Failed - Blood pressure under 140/90 in past 12 months     BP Readings from Last 3 Encounters:   05/14/20 (!) 144/83   12/03/19 120/70   08/29/18 136/70                 Passed - Patient is age 6 or older        Passed - Recent (12 mo) or future (30 days) visit within the authorizing provider's specialty     Patient has had an office visit with the authorizing provider or a provider within the authorizing providers department within the previous 12 mos or has a future within next 30 days. See \"Patient Info\" tab in inbasket, or \"Choose Columns\" in Meds & Orders section of the refill encounter.              Passed - Medication is active on med list           hydrochlorothiazide (HYDRODIURIL) 25 MG tablet [Pharmacy Med Name: HYDROCHLOROTHIAZIDE  25MG  TAB] 90 tablet 3     Sig: TAKE 1 TABLET BY MOUTH  DAILY       Diuretics (Including Combos) Protocol Failed - 10/24/2020  8:44 PM        Failed - Blood pressure under 140/90 in past 12 months     BP Readings from Last 3 Encounters:   05/14/20 (!) 144/83   12/03/19 120/70   08/29/18 136/70                 Failed - Normal serum potassium on file in past 12 months     Recent Labs   Lab Test 11/26/19  0940   POTASSIUM 3.1*                    Passed - Recent (12 mo) or future (30 days) visit within the authorizing provider's specialty     Patient has had an office visit with the authorizing provider or a provider within the authorizing providers department within the previous 12 mos or has a future within next 30 days. See \"Patient Info\" tab in inbasket, or \"Choose Columns\" in Meds & Orders section of the refill encounter.              Passed - Medication is active on " med list        Passed - Patient is age 18 or older        Passed - No active pregancy on record        Passed - Normal serum creatinine on file in past 12 months     Recent Labs   Lab Test 11/26/19  0940   CR 1.04              Passed - Normal serum sodium on file in past 12 months     Recent Labs   Lab Test 11/26/19  0940                 Passed - No positive pregnancy test in past 12 months

## 2020-10-27 NOTE — TELEPHONE ENCOUNTER
Routing refill request to provider for review/approval because:  Potassium: Drug not active on patient's medication list  Labs not current:  Potassium, TSH  Last BP over goal.      CHAR SaucedoN, RN

## 2020-10-27 NOTE — TELEPHONE ENCOUNTER
Call and schedule follow up with provider - due for labs and recheck BP in clinic.    Can fill for 1 month when completed.  CLEOPATRA Lopez

## 2020-10-28 RX ORDER — METOPROLOL SUCCINATE 100 MG/1
TABLET, EXTENDED RELEASE ORAL
Qty: 90 TABLET | Refills: 0 | Status: SHIPPED | OUTPATIENT
Start: 2020-10-28 | End: 2020-12-24

## 2020-10-28 RX ORDER — HYDROCHLOROTHIAZIDE 25 MG/1
TABLET ORAL
Qty: 90 TABLET | Refills: 0 | Status: SHIPPED | OUTPATIENT
Start: 2020-10-28 | End: 2020-12-24

## 2020-10-28 RX ORDER — LEVOTHYROXINE SODIUM 75 UG/1
TABLET ORAL
Qty: 90 TABLET | Refills: 0 | Status: SHIPPED | OUTPATIENT
Start: 2020-10-28 | End: 2020-12-24

## 2020-10-28 RX ORDER — POTASSIUM CHLORIDE 1500 MG/1
TABLET, EXTENDED RELEASE ORAL
Qty: 90 TABLET | Refills: 0 | Status: SHIPPED | OUTPATIENT
Start: 2020-10-28 | End: 2021-03-03

## 2020-10-28 NOTE — TELEPHONE ENCOUNTER
Dr. Martinez,   Patient has not returned call.  We sent a letter.  She uses mail order.  Would you be ok refilling for 90 days so she does not run out?    Dimple LOPEZ RN BSN

## 2020-10-28 NOTE — TELEPHONE ENCOUNTER
Covering for PCP (out of clinic today):  One refill sent pending further follow-up.    Thanks,  Kem Antonio MD

## 2020-12-18 DIAGNOSIS — E78.5 HYPERLIPIDEMIA LDL GOAL <130: Primary | ICD-10-CM

## 2020-12-18 DIAGNOSIS — E03.9 HYPOTHYROIDISM, UNSPECIFIED TYPE: ICD-10-CM

## 2020-12-18 DIAGNOSIS — I10 ESSENTIAL HYPERTENSION: ICD-10-CM

## 2020-12-21 NOTE — TELEPHONE ENCOUNTER
"Requested Prescriptions   Pending Prescriptions Disp Refills     metoprolol succinate ER (TOPROL-XL) 100 MG 24 hr tablet [Pharmacy Med Name: METOPROLOL SUCC ER 100MG TABLET] 90 tablet 3     Sig: TAKE 1 TABLET BY MOUTH  DAILY       Beta-Blockers Protocol Failed - 12/18/2020  8:50 PM        Failed - Blood pressure under 140/90 in past 12 months     BP Readings from Last 3 Encounters:   05/14/20 (!) 144/83   12/03/19 120/70   08/29/18 136/70                 Failed - Recent (12 mo) or future (30 days) visit within the authorizing provider's specialty     Patient has had an office visit with the authorizing provider or a provider within the authorizing providers department within the previous 12 mos or has a future within next 30 days. See \"Patient Info\" tab in inbasket, or \"Choose Columns\" in Meds & Orders section of the refill encounter.              Passed - Patient is age 6 or older        Passed - Medication is active on med list           hydrochlorothiazide (HYDRODIURIL) 25 MG tablet [Pharmacy Med Name: HYDROCHLOROTHIAZIDE  25MG  TAB] 90 tablet 3     Sig: TAKE 1 TABLET BY MOUTH  DAILY       Diuretics (Including Combos) Protocol Failed - 12/18/2020  8:50 PM        Failed - Blood pressure under 140/90 in past 12 months     BP Readings from Last 3 Encounters:   05/14/20 (!) 144/83   12/03/19 120/70   08/29/18 136/70                 Failed - Recent (12 mo) or future (30 days) visit within the authorizing provider's specialty     Patient has had an office visit with the authorizing provider or a provider within the authorizing providers department within the previous 12 mos or has a future within next 30 days. See \"Patient Info\" tab in inbasket, or \"Choose Columns\" in Meds & Orders section of the refill encounter.              Failed - Normal serum creatinine on file in past 12 months     Recent Labs   Lab Test 11/26/19  0940   CR 1.04              Failed - Normal serum potassium on file in past 12 months     Recent " "Labs   Lab Test 11/26/19  0940   POTASSIUM 3.1*                    Failed - Normal serum sodium on file in past 12 months     Recent Labs   Lab Test 11/26/19  0940                 Passed - Medication is active on med list        Passed - Patient is age 18 or older        Passed - No active pregancy on record        Passed - No positive pregnancy test in past 12 months           levothyroxine (SYNTHROID/LEVOTHROID) 75 MCG tablet [Pharmacy Med Name: LEVOTHYROXINE  75MCG  TAB] 90 tablet 3     Sig: TAKE 1 TABLET BY MOUTH  DAILY       Thyroid Protocol Failed - 12/18/2020  8:50 PM        Failed - Recent (12 mo) or future (30 days) visit within the authorizing provider's specialty     Patient has had an office visit with the authorizing provider or a provider within the authorizing providers department within the previous 12 mos or has a future within next 30 days. See \"Patient Info\" tab in inbasket, or \"Choose Columns\" in Meds & Orders section of the refill encounter.              Failed - Normal TSH on file in past 12 months     Recent Labs   Lab Test 11/26/19  0940   TSH 4.69*              Passed - Patient is 12 years or older        Passed - Medication is active on med list        Passed - No active pregnancy on record     If patient is pregnant or has had a positive pregnancy test, please check TSH.          Passed - No positive pregnancy test in past 12 months     If patient is pregnant or has had a positive pregnancy test, please check TSH.               "

## 2020-12-23 NOTE — TELEPHONE ENCOUNTER
Routing refill request to provider for review/approval because:  Last seen 12/3/19 with B/P of 144/83.  Advise.  KPaMatteoN

## 2020-12-24 RX ORDER — LEVOTHYROXINE SODIUM 75 UG/1
75 TABLET ORAL DAILY
Qty: 30 TABLET | Refills: 0 | Status: SHIPPED | OUTPATIENT
Start: 2020-12-24 | End: 2021-03-03

## 2020-12-24 RX ORDER — HYDROCHLOROTHIAZIDE 25 MG/1
25 TABLET ORAL DAILY
Qty: 30 TABLET | Refills: 0 | Status: SHIPPED | OUTPATIENT
Start: 2020-12-24 | End: 2021-03-03

## 2020-12-24 RX ORDER — METOPROLOL SUCCINATE 100 MG/1
100 TABLET, EXTENDED RELEASE ORAL DAILY
Qty: 30 TABLET | Refills: 0 | Status: SHIPPED | OUTPATIENT
Start: 2020-12-24 | End: 2021-03-03

## 2020-12-24 NOTE — TELEPHONE ENCOUNTER
One month given..  Needs to have fasting labs done.  Needs to be seen in clinic.  Sincerely,  Dago Martinez MD

## 2021-03-03 ENCOUNTER — OFFICE VISIT (OUTPATIENT)
Dept: FAMILY MEDICINE | Facility: CLINIC | Age: 56
End: 2021-03-03
Payer: COMMERCIAL

## 2021-03-03 ENCOUNTER — APPOINTMENT (OUTPATIENT)
Dept: LAB | Facility: CLINIC | Age: 56
End: 2021-03-03
Payer: COMMERCIAL

## 2021-03-03 VITALS
SYSTOLIC BLOOD PRESSURE: 158 MMHG | OXYGEN SATURATION: 96 % | BODY MASS INDEX: 28.46 KG/M2 | HEART RATE: 98 BPM | WEIGHT: 160.6 LBS | HEIGHT: 63 IN | DIASTOLIC BLOOD PRESSURE: 80 MMHG

## 2021-03-03 DIAGNOSIS — E03.9 HYPOTHYROIDISM, UNSPECIFIED TYPE: ICD-10-CM

## 2021-03-03 DIAGNOSIS — E87.6 HYPOKALEMIA: ICD-10-CM

## 2021-03-03 DIAGNOSIS — I10 ESSENTIAL HYPERTENSION: ICD-10-CM

## 2021-03-03 LAB
ANION GAP SERPL CALCULATED.3IONS-SCNC: 8 MMOL/L (ref 3–14)
BUN SERPL-MCNC: 24 MG/DL (ref 7–30)
CALCIUM SERPL-MCNC: 9.5 MG/DL (ref 8.5–10.1)
CHLORIDE SERPL-SCNC: 101 MMOL/L (ref 94–109)
CO2 SERPL-SCNC: 27 MMOL/L (ref 20–32)
CREAT SERPL-MCNC: 1.07 MG/DL (ref 0.52–1.04)
GFR SERPL CREATININE-BSD FRML MDRD: 57 ML/MIN/{1.73_M2}
GLUCOSE SERPL-MCNC: 79 MG/DL (ref 70–99)
POTASSIUM SERPL-SCNC: 3.4 MMOL/L (ref 3.4–5.3)
SODIUM SERPL-SCNC: 136 MMOL/L (ref 133–144)
TSH SERPL DL<=0.005 MIU/L-ACNC: 3.29 MU/L (ref 0.4–4)

## 2021-03-03 PROCEDURE — 36415 COLL VENOUS BLD VENIPUNCTURE: CPT | Performed by: NURSE PRACTITIONER

## 2021-03-03 PROCEDURE — 80048 BASIC METABOLIC PNL TOTAL CA: CPT | Performed by: NURSE PRACTITIONER

## 2021-03-03 PROCEDURE — 99214 OFFICE O/P EST MOD 30 MIN: CPT | Performed by: NURSE PRACTITIONER

## 2021-03-03 PROCEDURE — 84443 ASSAY THYROID STIM HORMONE: CPT | Performed by: NURSE PRACTITIONER

## 2021-03-03 RX ORDER — AMLODIPINE BESYLATE 5 MG/1
5 TABLET ORAL DAILY
Qty: 90 TABLET | Refills: 3 | Status: SHIPPED | OUTPATIENT
Start: 2021-03-03 | End: 2021-12-24

## 2021-03-03 RX ORDER — POTASSIUM CHLORIDE 1500 MG/1
20 TABLET, EXTENDED RELEASE ORAL DAILY
Qty: 90 TABLET | Refills: 3 | Status: SHIPPED | OUTPATIENT
Start: 2021-03-03 | End: 2022-03-01

## 2021-03-03 RX ORDER — METOPROLOL SUCCINATE 100 MG/1
100 TABLET, EXTENDED RELEASE ORAL DAILY
Qty: 90 TABLET | Refills: 3 | Status: SHIPPED | OUTPATIENT
Start: 2021-03-03 | End: 2021-12-31

## 2021-03-03 RX ORDER — HYDROCHLOROTHIAZIDE 25 MG/1
25 TABLET ORAL DAILY
Qty: 90 TABLET | Refills: 3 | Status: SHIPPED | OUTPATIENT
Start: 2021-03-03 | End: 2021-12-31

## 2021-03-03 RX ORDER — LEVOTHYROXINE SODIUM 75 UG/1
75 TABLET ORAL DAILY
Qty: 90 TABLET | Refills: 3 | Status: SHIPPED | OUTPATIENT
Start: 2021-03-03 | End: 2021-12-31

## 2021-03-03 ASSESSMENT — MIFFLIN-ST. JEOR: SCORE: 1286.57

## 2021-03-03 NOTE — PATIENT INSTRUCTIONS
Start new BP medication    Norvasc 5 mg daily evening    Continue Metoprolol and hydrochlorothiazide

## 2021-03-03 NOTE — PROGRESS NOTES
Assessment & Plan     Essential hypertension  -uncontrolled  -recommended to add Norvasc 5 mg daily evening  -recheck BP with RN in 2-3 weeks  - hydrochlorothiazide (HYDRODIURIL) 25 MG tablet; Take 1 tablet (25 mg) by mouth daily Patient needs to be seen in the office and to have lab done.  - metoprolol succinate ER (TOPROL-XL) 100 MG 24 hr tablet; Take 1 tablet (100 mg) by mouth daily Patient needs to be seen in the office and have labs done.  - Lipid panel reflex to direct LDL Fasting; Future  - amLODIPine (NORVASC) 5 MG tablet; Take 1 tablet (5 mg) by mouth daily    Hypothyroidism, unspecified type  -clinically euthyroid, refill provided, labs pending   - levothyroxine (SYNTHROID/LEVOTHROID) 75 MCG tablet; Take 1 tablet (75 mcg) by mouth daily Patient needs to be seen for further refills and lab done.  - TSH with free T4 reflex    Hypokalemia  -BMP pending   - potassium chloride ER (K-TAB) 20 MEQ CR tablet; Take 1 tablet (20 mEq) by mouth daily      See Patient Instructions    Return in about 3 weeks (around 3/24/2021) for BP Recheck.    [facility]    Arnoldo Pacheco is a 55 year old who presents for the following health issues     HPI       Hypertension Follow-up      Do you check your blood pressure regularly outside of the clinic? No     Are you following a low salt diet? No    Are your blood pressures ever more than 140 on the top number (systolic) OR more   than 90 on the bottom number (diastolic), for example 140/90? Unknown     Hypothyroidism Follow-up      Since last visit, patient describes the following symptoms: Weight stable, no hair loss, no skin changes, no constipation, no loose stools. Has fatigue but she works a lot.       How many servings of fruits and vegetables do you eat daily?  0-1    On average, how many sweetened beverages do you drink each day (Examples: soda, juice, sweet tea, etc.  Do NOT count diet or artificially sweetened  "beverages)?   2    How many days per week do you exercise enough to make your heart beat faster? 3 or less    How many minutes a day do you exercise enough to make your heart beat faster? 9 or less    How many days per week do you miss taking your medication? Every once in awhile; on the weekends.         Review of Systems   Constitutional, HEENT, cardiovascular, pulmonary, gi and gu systems are negative, except as otherwise noted.      Objective    BP (!) 158/80   Pulse 98   Ht 1.607 m (5' 3.25\")   Wt 72.8 kg (160 lb 9.6 oz)   LMP 03/12/2011   SpO2 96%   BMI 28.22 kg/m    Body mass index is 28.22 kg/m .  Physical Exam   GENERAL: healthy, alert and no distress  EYES: Eyes grossly normal to inspection, PERRL and conjunctivae and sclerae normal  RESP: lungs clear to auscultation - no rales, rhonchi or wheezes  CV: regular rate and rhythm, normal S1 S2, no S3 or S4, no murmur, click or rub, no peripheral edema and peripheral pulses strong  NEURO: Normal strength and tone, mentation intact and speech normal  PSYCH: mentation appears normal, affect normal/bright              "

## 2021-05-05 ENCOUNTER — TELEPHONE (OUTPATIENT)
Dept: DERMATOLOGY | Facility: CLINIC | Age: 56
End: 2021-05-05

## 2021-05-05 NOTE — TELEPHONE ENCOUNTER
Reason for call:  Other   Patient called regarding (reason for call): call back  Additional comments: Patient has reoccurring boils under armpit, and would like to know if she should be taking the medication she was prescribed last year. She has some left from the last time.     Phone number to reach patient:  Home number on file [phone] (home)    Best Time:  Any time    Can we leave a detailed message on this number?  YES    Travel screening: Not Applicable

## 2021-05-05 NOTE — TELEPHONE ENCOUNTER
Spoke with patient and she has a reoccurrence of boils under her armpits.  She stated that she does not think they are draining right now. She has been hot packing the left armpit. She asked if she should take her antibiotics from last year. Advised that it is not best to take old medications and to follow up in clinic. Pt reported she had an appt tomorrow.   Sarahi STUBBS RN BSN PHN  [facility]

## 2021-05-06 ENCOUNTER — OFFICE VISIT (OUTPATIENT)
Dept: DERMATOLOGY | Facility: CLINIC | Age: 56
End: 2021-05-06
Payer: COMMERCIAL

## 2021-05-06 VITALS — SYSTOLIC BLOOD PRESSURE: 151 MMHG | OXYGEN SATURATION: 97 % | HEART RATE: 108 BPM | DIASTOLIC BLOOD PRESSURE: 91 MMHG

## 2021-05-06 DIAGNOSIS — L08.0 PYODERMA: Primary | ICD-10-CM

## 2021-05-06 DIAGNOSIS — L02.93 CARBUNCLE: ICD-10-CM

## 2021-05-06 PROCEDURE — 99213 OFFICE O/P EST LOW 20 MIN: CPT | Performed by: PHYSICIAN ASSISTANT

## 2021-05-06 PROCEDURE — 87070 CULTURE OTHR SPECIMN AEROBIC: CPT | Performed by: PHYSICIAN ASSISTANT

## 2021-05-06 NOTE — LETTER
5/6/2021         RE: Tara Charlton  [address]  [place] [address]        Dear Colleague,    Thank you for referring your patient, Tara Charlton, to the [facility]. Please see a copy of my visit note below.    HPI:   Chief complaints: Tara Charlton is a pleasant 55 year old female who presents for evaluation of an inflamed cyst. She has had the cyst for awhile, but within the past few days it has become painful and started draining purulent drainage. She resumed doxycycline, hibiclens and clindamycin within the past day.         PHYSICAL EXAM:    BP (!) 151/91 (BP Location: Left arm, Patient Position: Sitting, Cuff Size: Adult Regular)   Pulse 108   LMP 03/12/2011   SpO2 97%   Skin exam performed as follows: Type 2 skin. Mood appropriate  Alert and Oriented X 3. Well developed, well nourished in no distress.  General appearance: Normal  Head including face: Normal  Eyes: conjunctiva and lids: Normal  Mouth: Lips, teeth, gums: Normal  Neck: Normal  Chest-breast/axillae: Normal  Back: Normal  Spleen and liver: Normal  Cardiovascular: Exam of peripheral vascular system by observation for swelling, varicosities, edema: Normal  Genitalia: groin, buttocks: Normal  Extremities: digits/nails (clubbing): Normal  Eccrine and Apocrine glands: Normal  Right upper extremity: Normal  Left upper extremity: Normal  Right lower extremity: Normal  Left lower extremity: Normal  Skin: Scalp and body hair: See below    1. Firm plaque with purulent drainage in the left axilla    ASSESSMENT/PLAN:     1. Inflamed carbuncle in the left axilla   --Resume doxycycline, hibiclens, clindamycin gel  --Culture taken today  --Actively draining, no I&D needed today        Follow-up: PRN  CC:   Scribed By: Lala Heath, MS, PADEANDRE          Again, thank you for allowing me to participate in the care of your patient.        Sincerely,        Lala Heath PA-C

## 2021-05-06 NOTE — NURSING NOTE
"Initial BP (!) 151/91 (BP Location: Left arm, Patient Position: Sitting, Cuff Size: Adult Regular)   Pulse 108   LMP 03/12/2011   SpO2 97%  Estimated body mass index is 28.22 kg/m  as calculated from the following:    Height as of 3/3/21: 1.607 m (5' 3.25\").    Weight as of 3/3/21: 72.8 kg (160 lb 9.6 oz). .      "

## 2021-05-06 NOTE — PROGRESS NOTES
HPI:   Chief complaints: Tara Charlton is a pleasant 55 year old female who presents for evaluation of an inflamed cyst. She has had the cyst for awhile, but within the past few days it has become painful and started draining purulent drainage. She resumed doxycycline, hibiclens and clindamycin within the past day.         PHYSICAL EXAM:    BP (!) 151/91 (BP Location: Left arm, Patient Position: Sitting, Cuff Size: Adult Regular)   Pulse 108   LMP 03/12/2011   SpO2 97%   Skin exam performed as follows: Type 2 skin. Mood appropriate  Alert and Oriented X 3. Well developed, well nourished in no distress.  General appearance: Normal  Head including face: Normal  Eyes: conjunctiva and lids: Normal  Mouth: Lips, teeth, gums: Normal  Neck: Normal  Chest-breast/axillae: Normal  Back: Normal  Spleen and liver: Normal  Cardiovascular: Exam of peripheral vascular system by observation for swelling, varicosities, edema: Normal  Genitalia: groin, buttocks: Normal  Extremities: digits/nails (clubbing): Normal  Eccrine and Apocrine glands: Normal  Right upper extremity: Normal  Left upper extremity: Normal  Right lower extremity: Normal  Left lower extremity: Normal  Skin: Scalp and body hair: See below    1. Firm plaque with purulent drainage in the left axilla    ASSESSMENT/PLAN:     1. Inflamed carbuncle in the left axilla   --Resume doxycycline, hibiclens, clindamycin gel  --Culture taken today  --Actively draining, no I&D needed today        Follow-up: PRN  CC:   Scribed By: Lala Heath, MS, PA-C

## 2021-05-08 LAB
BACTERIA SPEC CULT: NO GROWTH
Lab: NORMAL
SPECIMEN SOURCE: NORMAL

## 2021-05-11 ENCOUNTER — TELEPHONE (OUTPATIENT)
Dept: FAMILY MEDICINE | Facility: CLINIC | Age: 56
End: 2021-05-11

## 2021-05-11 NOTE — TELEPHONE ENCOUNTER
Dermatology was trying to reach pt.   Pt is eager to get her results and disappointed that she missed the derm call.    If new rx, send to [facility].  If short course is prescribed, pt would use [facility] at [place]    Pt works as a [employer]  from 3:45 am to 4 pm.   Best to try to reach pt after 4 pm.    America Neal RN

## 2021-08-05 ENCOUNTER — TELEPHONE (OUTPATIENT)
Dept: FAMILY MEDICINE | Facility: CLINIC | Age: 56
End: 2021-08-05

## 2021-08-05 NOTE — TELEPHONE ENCOUNTER
Panel Management Review      Patient has the following on her problem list: None      Composite cancer screening  Chart review shows that this patient is due/due soon for the following Pap Smear, Mammogram, Colonoscopy and Fecal Colorectal (FIT)  Summary:    Patient is due/failing the following:   COLONOSCOPY, FIT, MAMMOGRAM and PAP    Action needed:   Patient needs office visit for PE w/ pap, needs to schedule a mammogram and colonoscopy     Type of outreach:    Sent letter.    Questions for provider review:    None                                                                                                                                    Chely Lazcano, CMA              MEDICATIONS:  · See Medication List (bring to your doctor appointments).      VACCINES:  Most Recent Immunizations   Administered Date(s) Administered   • Hep B, adult 04/20/2010   • Influenza, injectable, quadrivalent 10/31/2014   • Influenza, injectable, quadrivalent, preservative-free 09/26/2017   • Influenza, seasonal, injectable, trivalent 10/19/2010   • Pneumococcal polysaccharide, adult, 23 valent 11/03/2017   • Tdap 10/07/2008       ACTIVITY:  · Weigh yourself daily (first thing in the morning, with same amount of clothes on) unless told otherwise by your doctor.  · Continue activity as you were in the hospital, slowly increase to what you were doing previously.  · Up as desired / no restrictions.    SMOKING:  · Avoid all tobacco products and secondhand smoke.  · Smoking Cessation Counseling offered.  · [place] Toll Free Quit Line: [phone]    DIET:  · Limit salt and salty foods unless told otherwise by your doctor.  · No Restrictions    · Trouble breathing or chest pain - CALL 911    CONTACT YOUR DOCTOR IF:  · You have symptoms that are not \"normal\" for you.  · You have new or worse symptoms or pain, not relieved by medicine or rest.  · Temperature greater than 101 degrees F, chills or flu like symptoms.  · You gain more than 3 pounds in 2 days.

## 2021-08-05 NOTE — LETTER
August 5, 2021      Tara Charlton  [address] [place] [place] [address]          Dear Tara Charlton,     At [facility] we care about your health and are committed to providing quality patient care, which includes staying current on preventative cancer screenings.  You can increase your chances of finding and treating cancers through regular screenings.      Our records show that you are due for the following screening(s):      Colonoscopy for colon cancer - Call [phone] to schedule   Recommended every ten years for everyone age 50 and older  Please take a moment to read over the enclosed information packet about colon cancer screening.   We strongly urge our patient's to consider having a colonoscopy done, which is the best screening test available and only needs to be done every 10 years if normal.      Mammogram for breast cancer - [phone] to schedule  Recommended every 1-2 years for women age 50 and older  Mammograms help detect breast cancer, which is the most common cancer among women in the [place].  You may need to start having mammograms earlier and more often if you have had breast cancer, breast problems, or a family history of breast cancer.     Pap Smear for cervical cancer - [phone] (option 2) to schedule  Recommended every three years for women 21 and older  A Pap test is used to detect cervical cancer.  The test should be taken at least once every three years but women who are at a greater risk for cervical cancer may need to have the test more often.      You are at a greater risk for cervical cancer if:   - You have had a sexually transmitted disease   - You have had more than one sex partner   - You have had an abnormal pap test in the past    If you have a My-Chart Account, you also can schedule this appointment through there.    If you have already had one or all of the above screening tests at another facility, please call us so that we may update  your chart.      Your partners in health,      Quality Committee   [facility]

## 2021-12-23 DIAGNOSIS — I10 ESSENTIAL HYPERTENSION: ICD-10-CM

## 2021-12-24 RX ORDER — AMLODIPINE BESYLATE 5 MG/1
TABLET ORAL
Qty: 90 TABLET | Refills: 0 | Status: SHIPPED | OUTPATIENT
Start: 2021-12-24 | End: 2022-03-02

## 2021-12-24 NOTE — TELEPHONE ENCOUNTER
Routing refill request to provider for review/approval because:  Last recorded blood pressure does not meet RN protocol parameters    Toro Soto RN

## 2021-12-24 NOTE — TELEPHONE ENCOUNTER
LM on patient VM prescription refilled, and to call to make bp appt with nurse. Roberta Martinez on 12/24/2021 at 11:02 AM

## 2021-12-30 DIAGNOSIS — I10 ESSENTIAL HYPERTENSION: ICD-10-CM

## 2021-12-30 DIAGNOSIS — E03.9 HYPOTHYROIDISM, UNSPECIFIED TYPE: ICD-10-CM

## 2021-12-31 RX ORDER — HYDROCHLOROTHIAZIDE 25 MG/1
25 TABLET ORAL DAILY
Qty: 30 TABLET | Refills: 0 | Status: SHIPPED | OUTPATIENT
Start: 2021-12-31 | End: 2022-02-15

## 2021-12-31 RX ORDER — METOPROLOL SUCCINATE 100 MG/1
100 TABLET, EXTENDED RELEASE ORAL DAILY
Qty: 30 TABLET | Refills: 0 | Status: SHIPPED | OUTPATIENT
Start: 2021-12-31 | End: 2022-02-16

## 2021-12-31 RX ORDER — LEVOTHYROXINE SODIUM 75 UG/1
TABLET ORAL
Qty: 90 TABLET | Refills: 3 | Status: SHIPPED | OUTPATIENT
Start: 2021-12-31 | End: 2022-03-02

## 2021-12-31 NOTE — TELEPHONE ENCOUNTER
Spoke to patient and was notified. Patient will call back to set up appointment.     Martha Henry on 12/31/2021 at 10:51 AM

## 2021-12-31 NOTE — TELEPHONE ENCOUNTER
Routing refill request to provider for review/approval because:  Needs labs and a blood pressure check.

## 2022-02-11 ENCOUNTER — TELEPHONE (OUTPATIENT)
Dept: FAMILY MEDICINE | Facility: CLINIC | Age: 57
End: 2022-02-11
Payer: COMMERCIAL

## 2022-02-11 DIAGNOSIS — I10 ESSENTIAL HYPERTENSION: ICD-10-CM

## 2022-02-14 DIAGNOSIS — I10 ESSENTIAL HYPERTENSION: ICD-10-CM

## 2022-02-14 RX ORDER — HYDROCHLOROTHIAZIDE 25 MG/1
TABLET ORAL
Qty: 30 TABLET | Refills: 11 | OUTPATIENT
Start: 2022-02-14

## 2022-02-14 RX ORDER — METOPROLOL SUCCINATE 100 MG/1
TABLET, EXTENDED RELEASE ORAL
Qty: 30 TABLET | Refills: 11 | OUTPATIENT
Start: 2022-02-14

## 2022-02-14 NOTE — TELEPHONE ENCOUNTER
Patient made appt with Emelia for 2/23/22. Would like labs ordered so she can take them prior to her appt. Roberta Martinez on 2/14/2022 at 12:15 PM

## 2022-02-14 NOTE — TELEPHONE ENCOUNTER
"Requested Prescriptions   Pending Prescriptions Disp Refills    hydrochlorothiazide (HYDRODIURIL) 25 MG tablet [Pharmacy Med Name: hydroCHLOROthiazide 25 MG Oral Tablet] 30 tablet 11     Sig: TAKE 1 TABLET BY MOUTH  DAILY PLEASE MAKE AN OFFICE VISIT IN PERSON. I HAVE NOT SEEN YOU IN 2 YEARS        Diuretics (Including Combos) Protocol Failed - 2/11/2022 10:40 PM        Failed - Blood pressure under 140/90 in past 12 months       BP Readings from Last 3 Encounters:   05/06/21 (!) 151/91   03/03/21 (!) 158/80   05/14/20 (!) 144/83                 Failed - Normal serum creatinine on file in past 12 months       Recent Labs   Lab Test 03/03/21  1610   CR 1.07*              Passed - Recent (12 mo) or future (30 days) visit within the authorizing provider's specialty     Patient has had an office visit with the authorizing provider or a provider within the authorizing providers department within the previous 12 mos or has a future within next 30 days. See \"Patient Info\" tab in inbasket, or \"Choose Columns\" in Meds & Orders section of the refill encounter.              Passed - Medication is active on med list        Passed - Patient is age 18 or older        Passed - No active pregancy on record        Passed - Normal serum potassium on file in past 12 months       Recent Labs   Lab Test 03/03/21  1610   POTASSIUM 3.4                    Passed - Normal serum sodium on file in past 12 months       Recent Labs   Lab Test 03/03/21  1610                 Passed - No positive pregnancy test in past 12 months           metoprolol succinate ER (TOPROL-XL) 100 MG 24 hr tablet [Pharmacy Med Name: Metoprolol Succinate  MG Oral Tablet Extended Release 24 Hour] 30 tablet 11     Sig: TAKE 1 TABLET BY MOUTH  DAILY PLEASE MAKE AN OFFICE VISIT IN PERSON. I HAVE NOT SEEN YOU IN 2 YEARS        Beta-Blockers Protocol Failed - 2/11/2022 10:40 PM        Failed - Blood pressure under 140/90 in past 12 months       BP Readings from " "Last 3 Encounters:   05/06/21 (!) 151/91   03/03/21 (!) 158/80   05/14/20 (!) 144/83                 Passed - Patient is age 6 or older        Passed - Recent (12 mo) or future (30 days) visit within the authorizing provider's specialty     Patient has had an office visit with the authorizing provider or a provider within the authorizing providers department within the previous 12 mos or has a future within next 30 days. See \"Patient Info\" tab in inbasket, or \"Choose Columns\" in Meds & Orders section of the refill encounter.              Passed - Medication is active on med list              "

## 2022-02-14 NOTE — TELEPHONE ENCOUNTER
I have denied the refill of her medications since I have not seen her in over 2 years and the refills came to me.  She needs to be seen and I do not see an future office visit with other other provider.  Please schedule her an office visit with a colleague or myself.    Dago Martinez MD  Family Medicine

## 2022-02-15 DIAGNOSIS — I10 ESSENTIAL HYPERTENSION: ICD-10-CM

## 2022-02-15 RX ORDER — HYDROCHLOROTHIAZIDE 25 MG/1
TABLET ORAL
Qty: 30 TABLET | Refills: 0 | Status: SHIPPED | OUTPATIENT
Start: 2022-02-15 | End: 2022-03-02 | Stop reason: SINTOL

## 2022-02-15 NOTE — TELEPHONE ENCOUNTER
Routing refill request to provider for review/approval because:  Labs not current:  creatinine  Elevated bp readings

## 2022-02-15 NOTE — TELEPHONE ENCOUNTER
Pending Prescriptions:                       Disp   Refills    metoprolol succinate ER (TOPROL-XL) 100 MG*30 tab*11       Sig: TAKE 1 TABLET BY MOUTH  DAILY PLEASE MAKE AN OFFICE           VISIT IN PERSON. I HAVE NOT SEEN YOU IN 2 YEARS    Routing refill request to provider for review/approval because:   Beta-Blockers Protocol Failed 02/14/2022 09:51 PM   Protocol Details    Blood pressure under 140/90 in past 12 months    Patient is age 6 or older    Recent (12 mo) or future (30 days) visit within the authorizing provider's specialty    Medication is active on med list        BP Readings from Last 3 Encounters:   05/06/21 (!) 151/91   03/03/21 (!) 158/80   05/14/20 (!) 144/83      Patient was seen by Emelia CAIN CNP on 3/3/21, has appointment scheduled with her for 2/23/22. Ok for korin refill?    Kenzie More RN  [facility]

## 2022-02-16 RX ORDER — METOPROLOL SUCCINATE 100 MG/1
100 TABLET, EXTENDED RELEASE ORAL DAILY
Qty: 30 TABLET | Refills: 0 | Status: SHIPPED | OUTPATIENT
Start: 2022-02-16 | End: 2022-03-02

## 2022-02-16 NOTE — TELEPHONE ENCOUNTER
Message given to patient with good understanding.  Patient made lab appt for 2/22/22. Roberta Martinez on 2/16/2022 at 10:07 AM

## 2022-02-26 DIAGNOSIS — E87.6 HYPOKALEMIA: ICD-10-CM

## 2022-02-28 ENCOUNTER — LAB (OUTPATIENT)
Dept: LAB | Facility: CLINIC | Age: 57
End: 2022-02-28
Payer: COMMERCIAL

## 2022-02-28 DIAGNOSIS — I10 ESSENTIAL HYPERTENSION: ICD-10-CM

## 2022-02-28 LAB
ANION GAP SERPL CALCULATED.3IONS-SCNC: 5 MMOL/L (ref 3–14)
BUN SERPL-MCNC: 6 MG/DL (ref 7–30)
CALCIUM SERPL-MCNC: 9.4 MG/DL (ref 8.5–10.1)
CHLORIDE BLD-SCNC: 92 MMOL/L (ref 94–109)
CHOLEST SERPL-MCNC: 264 MG/DL
CO2 SERPL-SCNC: 32 MMOL/L (ref 20–32)
CREAT SERPL-MCNC: 0.73 MG/DL (ref 0.52–1.04)
FASTING STATUS PATIENT QL REPORTED: YES
GFR SERPL CREATININE-BSD FRML MDRD: >90 ML/MIN/1.73M2
GLUCOSE BLD-MCNC: 115 MG/DL (ref 70–99)
HDLC SERPL-MCNC: 53 MG/DL
HOLD SPECIMEN: NORMAL
LDLC SERPL CALC-MCNC: 173 MG/DL
NONHDLC SERPL-MCNC: 211 MG/DL
POTASSIUM BLD-SCNC: 2.9 MMOL/L (ref 3.4–5.3)
SODIUM SERPL-SCNC: 129 MMOL/L (ref 133–144)
T4 FREE SERPL-MCNC: 1.51 NG/DL (ref 0.76–1.46)
TRIGL SERPL-MCNC: 189 MG/DL
TSH SERPL DL<=0.005 MIU/L-ACNC: 4.13 MU/L (ref 0.4–4)

## 2022-02-28 PROCEDURE — 80048 BASIC METABOLIC PNL TOTAL CA: CPT

## 2022-02-28 PROCEDURE — 84443 ASSAY THYROID STIM HORMONE: CPT

## 2022-02-28 PROCEDURE — 80061 LIPID PANEL: CPT

## 2022-02-28 PROCEDURE — 36415 COLL VENOUS BLD VENIPUNCTURE: CPT

## 2022-02-28 PROCEDURE — 84439 ASSAY OF FREE THYROXINE: CPT

## 2022-03-01 RX ORDER — POTASSIUM CHLORIDE 1500 MG/1
TABLET, EXTENDED RELEASE ORAL
Qty: 90 TABLET | Refills: 3 | Status: SHIPPED | OUTPATIENT
Start: 2022-03-01 | End: 2022-03-02

## 2022-03-02 ENCOUNTER — OFFICE VISIT (OUTPATIENT)
Dept: FAMILY MEDICINE | Facility: CLINIC | Age: 57
End: 2022-03-02
Payer: COMMERCIAL

## 2022-03-02 VITALS
OXYGEN SATURATION: 97 % | DIASTOLIC BLOOD PRESSURE: 84 MMHG | WEIGHT: 167 LBS | SYSTOLIC BLOOD PRESSURE: 138 MMHG | RESPIRATION RATE: 20 BRPM | TEMPERATURE: 98.1 F | HEIGHT: 64 IN | HEART RATE: 112 BPM | BODY MASS INDEX: 28.51 KG/M2

## 2022-03-02 DIAGNOSIS — Z12.31 VISIT FOR SCREENING MAMMOGRAM: ICD-10-CM

## 2022-03-02 DIAGNOSIS — I10 ESSENTIAL HYPERTENSION: Primary | ICD-10-CM

## 2022-03-02 DIAGNOSIS — Z12.11 SCREEN FOR COLON CANCER: ICD-10-CM

## 2022-03-02 DIAGNOSIS — E87.6 HYPOKALEMIA: ICD-10-CM

## 2022-03-02 DIAGNOSIS — E03.9 HYPOTHYROIDISM, UNSPECIFIED TYPE: ICD-10-CM

## 2022-03-02 LAB
ANION GAP SERPL CALCULATED.3IONS-SCNC: 8 MMOL/L (ref 3–14)
BUN SERPL-MCNC: 6 MG/DL (ref 7–30)
CALCIUM SERPL-MCNC: 9 MG/DL (ref 8.5–10.1)
CHLORIDE BLD-SCNC: 90 MMOL/L (ref 94–109)
CHOLEST SERPL-MCNC: 235 MG/DL
CO2 SERPL-SCNC: 30 MMOL/L (ref 20–32)
CREAT SERPL-MCNC: 0.71 MG/DL (ref 0.52–1.04)
FASTING STATUS PATIENT QL REPORTED: NO
GFR SERPL CREATININE-BSD FRML MDRD: >90 ML/MIN/1.73M2
GLUCOSE BLD-MCNC: 140 MG/DL (ref 70–99)
HDLC SERPL-MCNC: 68 MG/DL
LDLC SERPL CALC-MCNC: 140 MG/DL
NONHDLC SERPL-MCNC: 167 MG/DL
POTASSIUM BLD-SCNC: 3.1 MMOL/L (ref 3.4–5.3)
SODIUM SERPL-SCNC: 128 MMOL/L (ref 133–144)
TRIGL SERPL-MCNC: 136 MG/DL

## 2022-03-02 PROCEDURE — 90714 TD VACC NO PRESV 7 YRS+ IM: CPT | Performed by: NURSE PRACTITIONER

## 2022-03-02 PROCEDURE — 36415 COLL VENOUS BLD VENIPUNCTURE: CPT | Performed by: NURSE PRACTITIONER

## 2022-03-02 PROCEDURE — 80048 BASIC METABOLIC PNL TOTAL CA: CPT | Performed by: NURSE PRACTITIONER

## 2022-03-02 PROCEDURE — 90472 IMMUNIZATION ADMIN EACH ADD: CPT | Performed by: NURSE PRACTITIONER

## 2022-03-02 PROCEDURE — 99214 OFFICE O/P EST MOD 30 MIN: CPT | Mod: 25 | Performed by: NURSE PRACTITIONER

## 2022-03-02 PROCEDURE — 80061 LIPID PANEL: CPT | Performed by: NURSE PRACTITIONER

## 2022-03-02 PROCEDURE — 90471 IMMUNIZATION ADMIN: CPT | Performed by: NURSE PRACTITIONER

## 2022-03-02 PROCEDURE — 90682 RIV4 VACC RECOMBINANT DNA IM: CPT | Performed by: NURSE PRACTITIONER

## 2022-03-02 RX ORDER — LEVOTHYROXINE SODIUM 75 UG/1
TABLET ORAL
Qty: 90 TABLET | Refills: 3 | Status: SHIPPED | OUTPATIENT
Start: 2022-03-02 | End: 2022-06-22

## 2022-03-02 RX ORDER — AMLODIPINE BESYLATE 5 MG/1
5 TABLET ORAL DAILY
Qty: 90 TABLET | Refills: 3 | Status: SHIPPED | OUTPATIENT
Start: 2022-03-02 | End: 2023-02-20

## 2022-03-02 RX ORDER — POTASSIUM CHLORIDE 1500 MG/1
TABLET, EXTENDED RELEASE ORAL
Qty: 10 TABLET | Refills: 0 | Status: SHIPPED | OUTPATIENT
Start: 2022-03-02 | End: 2023-05-22

## 2022-03-02 RX ORDER — HYDROCHLOROTHIAZIDE 25 MG/1
TABLET ORAL
Qty: 30 TABLET | Refills: 0 | Status: CANCELLED | OUTPATIENT
Start: 2022-03-02

## 2022-03-02 RX ORDER — POTASSIUM CHLORIDE 1500 MG/1
20 TABLET, EXTENDED RELEASE ORAL DAILY
Qty: 90 TABLET | Refills: 3 | Status: CANCELLED | OUTPATIENT
Start: 2022-03-02

## 2022-03-02 RX ORDER — METOPROLOL SUCCINATE 100 MG/1
100 TABLET, EXTENDED RELEASE ORAL DAILY
Qty: 90 TABLET | Refills: 3 | Status: SHIPPED | OUTPATIENT
Start: 2022-03-02 | End: 2022-06-22

## 2022-03-02 ASSESSMENT — PAIN SCALES - GENERAL: PAINLEVEL: NO PAIN (0)

## 2022-03-02 NOTE — PROGRESS NOTES
Assessment & Plan     Essential hypertension  -well controlled   - amLODIPine (NORVASC) 5 MG tablet; Take 1 tablet (5 mg) by mouth daily  - metoprolol succinate ER (TOPROL-XL) 100 MG 24 hr tablet; Take 1 tablet (100 mg) by mouth daily  - Basic metabolic panel  (Ca, Cl, CO2, Creat, Gluc, K, Na, BUN); Future  - Lipid panel reflex to direct LDL Fasting  - Basic metabolic panel  (Ca, Cl, CO2, Creat, Gluc, K, Na, BUN)  - Basic metabolic panel  (Ca, Cl, CO2, Creat, Gluc, K, Na, BUN); Future    Screen for colon cancer  -patient declined screening     Visit for screening mammogram  -patient will call radiology to schedule mammogram     Hypokalemia  -due to hydrochlorothiazide, recommended to stop and start Potassium replacement for 7 days than recheck Potassium level again next week   - potassium chloride ER (K-TAB) 20 MEQ CR tablet; 20 MEQ twice daily for 3 days, than 20 MEQ daily for 4 days, than stop and recheck Potassium level next week  - Basic metabolic panel  (Ca, Cl, CO2, Creat, Gluc, K, Na, BUN); Future    Hypothyroidism, unspecified type  -T4 level was elevated, recommended to reduce Synthroid and recheck thyroid levels in 6 weeks   - levothyroxine (SYNTHROID/LEVOTHROID) 75 MCG tablet; Take 75 mcg 5 days per week and two days per week take only have tablet, or 37.5 mcg      Return in about 6 weeks (around 4/13/2022).    ANT Hoffman [facility]    Arnoldo Pacheco is a 56 year old who presents for the following health issues   History of Present Illness       Hypertension: She presents for follow up of hypertension.  She does not check blood pressure  regularly outside of the clinic. Outside blood pressures have been over 140/90. She does not follow a low salt diet.     She eats 0-1 servings of fruits and vegetables daily.She consumes 2 sweetened beverage(s) daily.She exercises with enough effort to increase her heart rate 20 to 29 minutes per day.  She exercises with  "enough effort to increase her heart rate 5 days per week.   She is taking medications regularly.       Hypertension Follow-up      Do you check your blood pressure regularly outside of the clinic? No     Are you following a low salt diet? Yes          Review of Systems   Constitutional, HEENT, cardiovascular, pulmonary, gi and gu systems are negative, except as otherwise noted.      Objective    /84 (BP Location: Left arm)   Pulse 112   Temp 98.1  F (36.7  C) (Tympanic)   Resp 20   Ht 1.613 m (5' 3.5\")   Wt 75.8 kg (167 lb)   LMP 03/12/2011   SpO2 97%   BMI 29.12 kg/m    Body mass index is 29.12 kg/m .  Physical Exam   GENERAL: healthy, alert and no distress  EYES: Eyes grossly normal to inspection, PERRL and conjunctivae and sclerae normal  RESP: lungs clear to auscultation - no rales, rhonchi or wheezes  CV: regular rate and rhythm, normal S1 S2, no S3 or S4, no murmur, click or rub, no peripheral edema and peripheral pulses strong  NEURO: Normal strength and tone, mentation intact and speech normal  PSYCH: mentation appears normal, affect normal/bright              "

## 2022-03-02 NOTE — PATIENT INSTRUCTIONS
Stop hydrochlorothiazide    Start Amlodipine 5 mg daily    Will recheck electrolytes today    Will plan to replace low Potassium     Synthroid 75 mg 5 days per week and take half tablet or 37.5 mcg 2 days per week and recheck thyroid levels in 6 week, lab only appointment

## 2022-03-10 ENCOUNTER — LAB (OUTPATIENT)
Dept: LAB | Facility: CLINIC | Age: 57
End: 2022-03-10
Payer: COMMERCIAL

## 2022-03-10 DIAGNOSIS — I10 ESSENTIAL HYPERTENSION: ICD-10-CM

## 2022-03-10 DIAGNOSIS — E87.6 HYPOKALEMIA: ICD-10-CM

## 2022-03-10 LAB
ANION GAP SERPL CALCULATED.3IONS-SCNC: 5 MMOL/L (ref 3–14)
BUN SERPL-MCNC: 10 MG/DL (ref 7–30)
CALCIUM SERPL-MCNC: 9 MG/DL (ref 8.5–10.1)
CHLORIDE BLD-SCNC: 104 MMOL/L (ref 94–109)
CO2 SERPL-SCNC: 27 MMOL/L (ref 20–32)
CREAT SERPL-MCNC: 0.83 MG/DL (ref 0.52–1.04)
GFR SERPL CREATININE-BSD FRML MDRD: 81 ML/MIN/1.73M2
GLUCOSE BLD-MCNC: 103 MG/DL (ref 70–99)
POTASSIUM BLD-SCNC: 4.2 MMOL/L (ref 3.4–5.3)
SODIUM SERPL-SCNC: 136 MMOL/L (ref 133–144)

## 2022-03-10 PROCEDURE — 80048 BASIC METABOLIC PNL TOTAL CA: CPT

## 2022-03-10 PROCEDURE — 36415 COLL VENOUS BLD VENIPUNCTURE: CPT

## 2022-04-21 NOTE — PROGRESS NOTES
Patient Quality Outreach    Patient is due for the following:   Colon Cancer Screening -    Breast Cancer Screening - Mammogram  Cervical Cancer Screening - PAP Needed  Physical  - due now    NEXT STEPS:   Schedule a yearly physical    Type of outreach:    Sent letter.      Questions for provider review:    None     JOYCE Garcia LPN

## 2022-04-21 NOTE — LETTER
[facility]  [address] [place] [address]  [phone]       April 21, 2022    Tara Charlton  [address]  [place] [address]    Dear Tara,    We care about your health and have reviewed your health plan and are making recommendations based on this review, to optimize your health.    You are in particular need of attention regarding:  -Breast Cancer Screening  -Colon Cancer Screening  -Cervical Cancer Screening  -Wellness (Physical) Visit       We are recommending that you:                                                                                                                 -schedule a WELLNESS (Physical) APPOINTMENT.   We will check fasting labs the same day - you should have nothing to eat except water and meds for 8-10 hours prior.                                                                                                                  -schedule a MAMMOGRAM which is due.         1 in 8 women will develop invasive breast cancer during her lifetime and it is the most common non-skin cancer in [place] women.  EARLY detection, new treatments, and a better understanding of the disease have increased survival rates - the 5 year survival rate in the 1960s was 63% and today it is close to 90%.         If you are under/uninsured, we recommend you contact the [facility]. They offer mammograms at no charge or on a sliding fee charge. You can schedule with them at [phone]. Please have them send us the results.           Please disregard this reminder if you have had this exam elsewhere within the last year.  It would be helpful for us to have a copy of your mammogram report in our file so that we can best coordinate your care - please contact us with when your test was done so we can update your record.                                                                                                                      -schedule a  COLONOSCOPY to look for colon cancer (due every 10 years or 5 years in higher risk situations.)        Colon cancer is now the second leading cause of cancer-related deaths in the [place] for both men and women and there are over 130,000 new cases and 50,000 deaths per year from colon cancer.  Colonoscopies can prevent 90-95% of these deaths.  Problem lesions can be removed before they ever become cancer.  This test is not only looking for cancer, but also getting rid of precancerious lesions.    If you are under/uninsured, we recommend you contact the [facility] program. [facility] is a free colorectal cancer screening program that provides colonoscopies for eligible under/uninsured [place] men and women. If you are interested in receiving a free colonoscopy, please call [facility] at [phone] (mention code [employer]) to see if you re eligible.    If you do not wish to do a colonoscopy or cannot afford to do one, at this time, there is another option. It is called a FIT test or Fecal Immunochemical Occult Blood Test (take home stool sample kit).  It does not replace the colonoscopy for colorectal cancer screening, but it can detect hidden bleeding in the lower colon.  It does need to be repeated every year and if a positive result is obtained, you would be referred for a colonoscopy.       If you have completed either one of these tests at another facility, please call with the details of when and where the tests were done and if they were normal or not. Or have the records sent to our clinic so that we can best coordinate your care.    -schedule a PAP SMEAR EXAM which is due.  Please disregard this reminder if you have had this exam elsewhere within the last year.  It would be helpful for us to have a copy of your recent pap smear report in our file so that we can best coordinate your care.    If you are under/uninsured, we recommend you contact the [facility]. They offer pap smears at no charge  or on a sliding fee charge. You can schedule with them at [phone]. Please have them send us the results.                                                                                                In addition, here is a list of due or overdue Health Maintenance reminders.    Health Maintenance Due   Topic Date Due     Discuss Advance Care Planning  Never done     Pneumococcal Vaccine (1 of 2 - PPSV23) Never done     Colorectal Cancer Screening  Never done     HIV Screening  Never done     Hepatitis C Screening  Never done     Zoster (Shingles) Vaccine (1 of 2) Never done     LUNG CANCER SCREENING  Never done     Preventive Care Visit  08/26/2016     PAP Smear  08/26/2018     Mammogram  09/06/2019       To address the above recommendations, we encourage you to contact us at [phone], via [employer] or by contacting Central Scheduling toll free at [phone] 24 hours a day. They will assist you with finding the most convenient time and location..    Thank you for trusting [facility] and we appreciate the opportunity to serve you.  We look forward to supporting your healthcare needs in the future.    Healthy Regards,    Your [facility] Team

## 2022-06-21 ENCOUNTER — TELEPHONE (OUTPATIENT)
Dept: FAMILY MEDICINE | Facility: CLINIC | Age: 57
End: 2022-06-21

## 2022-06-21 DIAGNOSIS — E03.9 HYPOTHYROIDISM, UNSPECIFIED TYPE: ICD-10-CM

## 2022-06-21 DIAGNOSIS — I10 ESSENTIAL HYPERTENSION: ICD-10-CM

## 2022-06-21 NOTE — TELEPHONE ENCOUNTER
Writer receives a 3-way call from patient and representative at [facility], requesting clarification of her current medication list. Says that there was some recent confusion with the metoprolol dosage, and just wanted to verify that her medications are currently accurate. Also says that she's still currently off of the hydrochlorothiazide and potassium, and wants to ensure that this is correct. Current medication list was reviewed with both parties, will route to ordering provider to ensure accuracy. Pharmacy says that they will request a refill of the metoprolol, as it didn't come through to them as currently ordered.    1. Amlodipine 5mg daily  2. Metoprolol succinate ER 100mg daily  3. Levothyroxine 75 mcg 5 days per week and two days per week take only have tablet, or 37.5 mcg    Kenzei More RN  [facility]

## 2022-06-22 RX ORDER — METOPROLOL SUCCINATE 100 MG/1
100 TABLET, EXTENDED RELEASE ORAL DAILY
Qty: 90 TABLET | Refills: 3 | Status: SHIPPED | OUTPATIENT
Start: 2022-06-22 | End: 2023-05-22

## 2022-06-22 RX ORDER — LEVOTHYROXINE SODIUM 75 UG/1
TABLET ORAL
Qty: 90 TABLET | Refills: 3 | Status: SHIPPED | OUTPATIENT
Start: 2022-06-22 | End: 2023-05-22 | Stop reason: DRUGHIGH

## 2022-06-22 NOTE — TELEPHONE ENCOUNTER
All correct. Metoprolol ER dose is 100 mg daily.    Patient is due for lab appointment to recheck electrolyte panel, this was ordered to follow up on low Sodium and Potassium levels.    ANT Hoffman CNP

## 2022-06-22 NOTE — TELEPHONE ENCOUNTER
Pt states was to have levels rechecked 7 days after appt so came in 3/10 and na+ and K+ were normal.     Was there another lab appt needed?      Hilario Hernandez RN

## 2022-07-29 ENCOUNTER — TELEPHONE (OUTPATIENT)
Dept: FAMILY MEDICINE | Facility: CLINIC | Age: 57
End: 2022-07-29

## 2022-07-29 NOTE — TELEPHONE ENCOUNTER
Patient Quality Outreach    Patient is due for the following:   Colon Cancer Screening -  FIT and Cologuard  Breast Cancer Screening - Mammogram  Cervical Cancer Screening - PAP Needed  Physical  - DUE NOW  Immunizations  -  Covid and Pneumococcal    NEXT STEPS:   Schedule a yearly physical    Type of outreach:    Sent letter.      Questions for provider review:    None     JOYCE Garcia LPN

## 2022-07-29 NOTE — LETTER
July 29, 2022      Tara Charlton  [address] [place] [address]      Your healthcare team cares about your health. To provide you with the best care,   we have reviewed your chart and based on our findings, we see that you are due to:     - BREAST CANCER SCREENING:  Schedule Annual Mammogram. [facility] scheduling number - [phone] or schedule in MyChart (self referall).    - CERVICAL CANCER SCREENING: Schedule a Cervical Cancer Screening, with Pap and wellness exam.     - COLON CANCER SCREENING:  Call or mychart the clinic to schedule your colonoscopy or schedule/ your FIT Test, or Cologuard test (take home stool testing kit).    -IMMUNIZATIONS: Here is a list of what is due or overdue: Pneumococcal (pneumonia vaccine) and the Covid-19 booster.    If you have already completed these items, please contact the clinic via phone or   Mychart so your care team can review and update your records. Thank you for   choosing [facility] for your healthcare needs. For any questions,   concerns, or to schedule an appointment please contact the clinic at [phone].       Healthy Regards,      Your [facility] Team

## 2022-11-23 ENCOUNTER — TELEPHONE (OUTPATIENT)
Dept: FAMILY MEDICINE | Facility: CLINIC | Age: 57
End: 2022-11-23

## 2022-11-23 NOTE — TELEPHONE ENCOUNTER
Patient Quality Outreach    Patient is due for the following:   Colon Cancer Screening  Breast Cancer Screening - Mammogram  Cervical Cancer Screening - PAP Needed    Next Steps:   Schedule a Adult Preventative    Type of outreach:    Sent letter.    Next Steps:  Reach out within 90 days via Letter.    Max number of attempts reached: No. Will try again in 90 days if patient still on fail list.    Questions for provider review:    None     Chely Lazcano CMA  Chart routed to none.

## 2022-11-23 NOTE — LETTER
November 23, 2022    To  Tara Charlton  [address]  [place] [address]    Your team at [facility] cares about your health. We have reviewed your chart and based on our findings; we are making the following recommendations to better manage your health.     You are in particular need of attention regarding the following:     Schedule Annual MAMMOGRAPHY. [facility] scheduling number is [phone] or schedule in [email or web address] (self referral).  Schedule a primary care office visit with your provider for a Pap Smear to screen for Cervical Cancer.  Call or MyChart message your clinic to schedule a colonoscopy, schedule/ a FIT Test, or order a Cologuard test. If you are unsure what type of test you need, please call your clinic and speak to clinic staff.   Colon cancer is now the second leading cause of cancer-related deaths in the [place] [facility] for both men and women and there are over 130,000 new cases and 50,000 deaths per year from colon cancer. Colonoscopies can prevent 90-95% of these deaths. Problem lesions can be removed before they ever become cancer. This test is not only looking for cancer, but also getting rid of precancerous lesions.   If you are under/uninsured, we recommend you contact the [employer] Program.[employer] is a free colorectal cancer screening program that provides colonoscopies for eligible under/uninsured [place] men and women. If you are interested in receiving a free colonoscopy, please call [employer] at t [phone] (mention code [employer]) to see if you're eligible. Please have them send us the results.     If you have already completed these items, please contact the clinic via phone or   [employer] so your care team can review and update your records. Thank you for   choosing [facility] for your healthcare needs. For any questions,   concerns, or to schedule an appointment please contact our clinic.    Healthy Regards,      Your   [facility]

## 2023-01-17 ENCOUNTER — TRANSFERRED RECORDS (OUTPATIENT)
Dept: HEALTH INFORMATION MANAGEMENT | Facility: CLINIC | Age: 58
End: 2023-01-17

## 2023-01-17 LAB
ALT SERPL-CCNC: 27 IU/L (ref 0–32)
AST SERPL-CCNC: 62 IU/L (ref 0–40)
CHOLESTEROL (EXTERNAL): 226 MG/DL (ref 100–199)
CREATININE (EXTERNAL): 0.74 MG/DL (ref 0.57–1)
GFR ESTIMATED (EXTERNAL): 93 ML/MIN/1.73
GLUCOSE (EXTERNAL): 134 MG/DL (ref 70–99)
HBA1C MFR BLD: 5.5 % (ref 4.8–5.6)
HDLC SERPL-MCNC: 60 MG/DL
LDL CHOLESTEROL CALCULATED (EXTERNAL): 143 MG/DL (ref 0–99)
POTASSIUM (EXTERNAL): 3.7 MMOL/L (ref 3.5–5.2)
TRIGLYCERIDES (EXTERNAL): 130 MG/DL (ref 0–149)
TSH SERPL-ACNC: 8.02 UIU/ML (ref 0.45–4.5)

## 2023-02-17 DIAGNOSIS — I10 ESSENTIAL HYPERTENSION: ICD-10-CM

## 2023-02-20 RX ORDER — AMLODIPINE BESYLATE 5 MG/1
TABLET ORAL
Qty: 90 TABLET | Refills: 0 | Status: SHIPPED | OUTPATIENT
Start: 2023-02-20 | End: 2023-05-15

## 2023-05-13 DIAGNOSIS — I10 ESSENTIAL HYPERTENSION: ICD-10-CM

## 2023-05-15 RX ORDER — AMLODIPINE BESYLATE 5 MG/1
TABLET ORAL
Qty: 90 TABLET | Refills: 0 | Status: SHIPPED | OUTPATIENT
Start: 2023-05-15 | End: 2023-05-22

## 2023-05-15 NOTE — TELEPHONE ENCOUNTER
"Pending Prescriptions:                       Disp   Refills    amLODIPine (NORVASC) 5 MG tablet [Pharmacy*90 tab*3        Sig: TAKE 1 TABLET BY MOUTH  DAILY     Routing refill request to provider for review/approval because:       Requested Prescriptions   Pending Prescriptions Disp Refills    amLODIPine (NORVASC) 5 MG tablet [Pharmacy Med Name: amLODIPine Besylate 5 MG Oral Tablet] 90 tablet 3     Sig: TAKE 1 TABLET BY MOUTH  DAILY       Calcium Channel Blockers Protocol  Failed - 5/13/2023 10:27 PM        Failed - Blood pressure under 140/90 in past 12 months     BP Readings from Last 3 Encounters:   03/02/22 138/84   05/06/21 (!) 151/91   03/03/21 (!) 158/80                 Failed - Recent (12 mo) or future (30 days) visit within the authorizing provider's specialty     Patient has had an office visit with the authorizing provider or a provider within the authorizing providers department within the previous 12 mos or has a future within next 30 days. See \"Patient Info\" tab in inbasket, or \"Choose Columns\" in Meds & Orders section of the refill encounter.              Failed - Normal serum creatinine on file in past 12 months     Recent Labs   Lab Test 03/10/22  0826   CR 0.83       Ok to refill medication if creatinine is low          Passed - Medication is active on med list        Passed - Patient is age 18 or older        Passed - No active pregnancy on record        Passed - No positive pregnancy test in past 12 months            "

## 2023-05-22 ENCOUNTER — OFFICE VISIT (OUTPATIENT)
Dept: FAMILY MEDICINE | Facility: CLINIC | Age: 58
End: 2023-05-22
Payer: COMMERCIAL

## 2023-05-22 VITALS
HEIGHT: 63 IN | DIASTOLIC BLOOD PRESSURE: 66 MMHG | WEIGHT: 176.2 LBS | BODY MASS INDEX: 31.22 KG/M2 | OXYGEN SATURATION: 95 % | TEMPERATURE: 98.4 F | HEART RATE: 106 BPM | SYSTOLIC BLOOD PRESSURE: 136 MMHG | RESPIRATION RATE: 20 BRPM

## 2023-05-22 DIAGNOSIS — E83.42 HYPOMAGNESEMIA: ICD-10-CM

## 2023-05-22 DIAGNOSIS — R73.9 HYPERGLYCEMIA: ICD-10-CM

## 2023-05-22 DIAGNOSIS — R74.8 ELEVATED LIVER ENZYMES: ICD-10-CM

## 2023-05-22 DIAGNOSIS — G62.9 PERIPHERAL POLYNEUROPATHY: Primary | ICD-10-CM

## 2023-05-22 DIAGNOSIS — R79.89 LOW VITAMIN B12 LEVEL: ICD-10-CM

## 2023-05-22 DIAGNOSIS — D75.89 MACROCYTOSIS WITHOUT ANEMIA: ICD-10-CM

## 2023-05-22 DIAGNOSIS — F32.1 MODERATE MAJOR DEPRESSION (H): ICD-10-CM

## 2023-05-22 DIAGNOSIS — E03.9 HYPOTHYROIDISM, UNSPECIFIED TYPE: ICD-10-CM

## 2023-05-22 DIAGNOSIS — E78.5 HYPERLIPIDEMIA LDL GOAL <100: ICD-10-CM

## 2023-05-22 DIAGNOSIS — R79.89 ELEVATED FERRITIN: ICD-10-CM

## 2023-05-22 DIAGNOSIS — I10 ESSENTIAL HYPERTENSION: ICD-10-CM

## 2023-05-22 DIAGNOSIS — R79.89 LOW VITAMIN D LEVEL: ICD-10-CM

## 2023-05-22 LAB
ALBUMIN SERPL BCG-MCNC: 3.8 G/DL (ref 3.5–5.2)
ALP SERPL-CCNC: 160 U/L (ref 35–104)
ALT SERPL W P-5'-P-CCNC: 27 U/L (ref 10–35)
ANION GAP SERPL CALCULATED.3IONS-SCNC: 15 MMOL/L (ref 7–15)
AST SERPL W P-5'-P-CCNC: 59 U/L (ref 10–35)
BASOPHILS # BLD AUTO: 0 10E3/UL (ref 0–0.2)
BASOPHILS NFR BLD AUTO: 0 %
BILIRUB DIRECT SERPL-MCNC: 0.64 MG/DL (ref 0–0.3)
BILIRUB SERPL-MCNC: 3.1 MG/DL
BUN SERPL-MCNC: 11.4 MG/DL (ref 8–23)
CALCIUM SERPL-MCNC: 8.4 MG/DL (ref 8.6–10)
CHLORIDE SERPL-SCNC: 82 MMOL/L (ref 98–107)
CREAT SERPL-MCNC: 0.81 MG/DL (ref 0.51–0.95)
DEPRECATED HCO3 PLAS-SCNC: 31 MMOL/L (ref 22–29)
EOSINOPHIL # BLD AUTO: 0 10E3/UL (ref 0–0.7)
EOSINOPHIL NFR BLD AUTO: 0 %
ERYTHROCYTE [DISTWIDTH] IN BLOOD BY AUTOMATED COUNT: 16.3 % (ref 10–15)
FERRITIN SERPL-MCNC: 980 NG/ML (ref 11–328)
FOLATE SERPL-MCNC: 9.3 NG/ML (ref 4.6–34.8)
GFR SERPL CREATININE-BSD FRML MDRD: 83 ML/MIN/1.73M2
GLUCOSE SERPL-MCNC: 156 MG/DL (ref 70–99)
HBA1C MFR BLD: 5.7 % (ref 0–5.6)
HCT VFR BLD AUTO: 35.6 % (ref 35–47)
HGB BLD-MCNC: 13.2 G/DL (ref 11.7–15.7)
IMM GRANULOCYTES # BLD: 0.1 10E3/UL
IMM GRANULOCYTES NFR BLD: 1 %
IRON BINDING CAPACITY (ROCHE): ABNORMAL
IRON SATN MFR SERPL: ABNORMAL %
IRON SERPL-MCNC: 286 UG/DL (ref 37–145)
LYMPHOCYTES # BLD AUTO: 1.7 10E3/UL (ref 0.8–5.3)
LYMPHOCYTES NFR BLD AUTO: 11 %
MCH RBC QN AUTO: 40.5 PG (ref 26.5–33)
MCHC RBC AUTO-ENTMCNC: 37.1 G/DL (ref 31.5–36.5)
MCV RBC AUTO: 109 FL (ref 78–100)
MONOCYTES # BLD AUTO: 0.8 10E3/UL (ref 0–1.3)
MONOCYTES NFR BLD AUTO: 5 %
NEUTROPHILS # BLD AUTO: 12.6 10E3/UL (ref 1.6–8.3)
NEUTROPHILS NFR BLD AUTO: 82 %
PLATELET # BLD AUTO: 253 10E3/UL (ref 150–450)
POTASSIUM SERPL-SCNC: 3.2 MMOL/L (ref 3.4–5.3)
PROT SERPL-MCNC: 6.9 G/DL (ref 6.4–8.3)
RBC # BLD AUTO: 3.26 10E6/UL (ref 3.8–5.2)
RETICS # AUTO: 0.09 10E6/UL (ref 0.03–0.1)
RETICS/RBC NFR AUTO: 2.6 % (ref 0.5–2)
SODIUM SERPL-SCNC: 128 MMOL/L (ref 136–145)
WBC # BLD AUTO: 15.2 10E3/UL (ref 4–11)

## 2023-05-22 PROCEDURE — 82607 VITAMIN B-12: CPT | Performed by: FAMILY MEDICINE

## 2023-05-22 PROCEDURE — 82746 ASSAY OF FOLIC ACID SERUM: CPT | Performed by: FAMILY MEDICINE

## 2023-05-22 PROCEDURE — 82728 ASSAY OF FERRITIN: CPT | Performed by: FAMILY MEDICINE

## 2023-05-22 PROCEDURE — 80053 COMPREHEN METABOLIC PANEL: CPT | Performed by: FAMILY MEDICINE

## 2023-05-22 PROCEDURE — 36415 COLL VENOUS BLD VENIPUNCTURE: CPT | Performed by: FAMILY MEDICINE

## 2023-05-22 PROCEDURE — 82248 BILIRUBIN DIRECT: CPT | Performed by: FAMILY MEDICINE

## 2023-05-22 PROCEDURE — 85025 COMPLETE CBC W/AUTO DIFF WBC: CPT | Performed by: FAMILY MEDICINE

## 2023-05-22 PROCEDURE — 99215 OFFICE O/P EST HI 40 MIN: CPT | Performed by: FAMILY MEDICINE

## 2023-05-22 PROCEDURE — 83550 IRON BINDING TEST: CPT | Performed by: FAMILY MEDICINE

## 2023-05-22 PROCEDURE — 85045 AUTOMATED RETICULOCYTE COUNT: CPT | Performed by: FAMILY MEDICINE

## 2023-05-22 PROCEDURE — 83036 HEMOGLOBIN GLYCOSYLATED A1C: CPT | Performed by: FAMILY MEDICINE

## 2023-05-22 PROCEDURE — 83540 ASSAY OF IRON: CPT | Performed by: FAMILY MEDICINE

## 2023-05-22 PROCEDURE — 85060 BLOOD SMEAR INTERPRETATION: CPT | Performed by: PATHOLOGY

## 2023-05-22 RX ORDER — METOPROLOL SUCCINATE 100 MG/1
100 TABLET, EXTENDED RELEASE ORAL DAILY
Qty: 90 TABLET | Refills: 3 | Status: SHIPPED | OUTPATIENT
Start: 2023-05-22 | End: 2023-07-31

## 2023-05-22 RX ORDER — UBIDECARENONE 75 MG
100 CAPSULE ORAL DAILY
COMMUNITY
Start: 2023-05-22 | End: 2023-07-21

## 2023-05-22 RX ORDER — LEVOTHYROXINE SODIUM 75 UG/1
TABLET ORAL
Qty: 90 TABLET | Refills: 3 | Status: CANCELLED | OUTPATIENT
Start: 2023-05-22

## 2023-05-22 RX ORDER — AMLODIPINE BESYLATE 5 MG/1
5 TABLET ORAL DAILY
Qty: 90 TABLET | Refills: 3 | Status: SHIPPED | OUTPATIENT
Start: 2023-05-22 | End: 2023-07-31

## 2023-05-22 RX ORDER — ERGOCALCIFEROL 1.25 MG/1
50000 CAPSULE, LIQUID FILLED ORAL WEEKLY
Qty: 8 CAPSULE | COMMUNITY
Start: 2023-05-22 | End: 2023-07-21

## 2023-05-22 RX ORDER — MAGNESIUM OXIDE 400 MG/1
400 TABLET ORAL 2 TIMES DAILY
COMMUNITY
Start: 2023-05-22 | End: 2023-07-21

## 2023-05-22 RX ORDER — LEVOTHYROXINE SODIUM 100 UG/1
100 TABLET ORAL DAILY
Qty: 90 TABLET | Refills: 1 | Status: SHIPPED | OUTPATIENT
Start: 2023-05-22 | End: 2023-07-31

## 2023-05-22 ASSESSMENT — ANXIETY QUESTIONNAIRES
GAD7 TOTAL SCORE: 18
6. BECOMING EASILY ANNOYED OR IRRITABLE: NEARLY EVERY DAY
IF YOU CHECKED OFF ANY PROBLEMS ON THIS QUESTIONNAIRE, HOW DIFFICULT HAVE THESE PROBLEMS MADE IT FOR YOU TO DO YOUR WORK, TAKE CARE OF THINGS AT HOME, OR GET ALONG WITH OTHER PEOPLE: SOMEWHAT DIFFICULT
5. BEING SO RESTLESS THAT IT IS HARD TO SIT STILL: MORE THAN HALF THE DAYS
GAD7 TOTAL SCORE: 18
3. WORRYING TOO MUCH ABOUT DIFFERENT THINGS: MORE THAN HALF THE DAYS
7. FEELING AFRAID AS IF SOMETHING AWFUL MIGHT HAPPEN: NEARLY EVERY DAY
2. NOT BEING ABLE TO STOP OR CONTROL WORRYING: MORE THAN HALF THE DAYS
1. FEELING NERVOUS, ANXIOUS, OR ON EDGE: NEARLY EVERY DAY

## 2023-05-22 ASSESSMENT — PATIENT HEALTH QUESTIONNAIRE - PHQ9
SUM OF ALL RESPONSES TO PHQ QUESTIONS 1-9: 17
5. POOR APPETITE OR OVEREATING: NEARLY EVERY DAY

## 2023-05-22 ASSESSMENT — PAIN SCALES - GENERAL: PAINLEVEL: NO PAIN (0)

## 2023-05-22 NOTE — PATIENT INSTRUCTIONS
The 10-year ASCVD risk score (Jose GUILLERMO, et al., 2019) is: 9.4%    Values used to calculate the score:      Age: 57 years      Sex: Female      Is Non- : No      Diabetic: No      Tobacco smoker: Yes      Systolic Blood Pressure: 136 mmHg      Is BP treated: Yes      HDL Cholesterol: 68 mg/dL      Total Cholesterol: 235 mg/dL    I recommend to quit smoking.    I am increasing your thyroid medication.    Please stop using alcohol.    Please start atorvastatin 20 mg a day to lower your cholesterol and to reduce your risk of heart disease in the future.    Follow up in 2 months.    For your depression please try the sertraline medication.  Follow the instructions.  If you want to see Yajaira who is a counselor here please let me know.

## 2023-05-22 NOTE — PROGRESS NOTES
Assessment & Plan     Peripheral polyneuropathy  Unclear etiology, recommend to quit alcohol use, correct thyroid, continue with replacement vitamins, and recheck labs and do further work up due to macrocysotosis    Hypothyroidism, unspecified type  adjustment in medication and recheck in 2 months.  - TSH; Future  - T4 FREE; Future  - levothyroxine (SYNTHROID/LEVOTHROID) 100 MCG tablet; Take 1 tablet (100 mcg) by mouth daily    Essential hypertension  Borderline control, monitor, refill medications.  She admits to missing the amlodipine at times  - Basic metabolic panel; Future  - metoprolol succinate ER (TOPROL XL) 100 MG 24 hr tablet; Take 1 tablet (100 mg) by mouth daily  - amLODIPine (NORVASC) 5 MG tablet; Take 1 tablet (5 mg) by mouth daily    Hypomagnesemia  On replacement and recheck in 2 months  - Magnesium; Future    Low vitamin B12 level  On replacement and recheck in 2 months  - Vitamin B12; Future    Macrocytosis without anemia  Doing further work up to make sure there is nothing underlying  - CBC with Platelets & Differential; Future  - Folate; Future  - Lab Blood Morphology Pathologist Review; Future    Low vitamin D level  Check level in 2 months  - Vitamin D Deficiency; Future    Hyperglycemia  Recheck   - Hemoglobin A1c; Future  - Basic metabolic panel; Future    Elevated liver enzymes  Recheck today  - Hepatic function panel; Future  - Iron & Iron Binding Capacity; Future  - Lab Blood Morphology Pathologist Review; Future  - Ferritin; Future  - Iron and iron binding capacity; Future    Elevated ferritin    - Hepatic function panel; Future  - Iron & Iron Binding Capacity; Future  - Lab Blood Morphology Pathologist Review; Future  - Ferritin; Future  - Iron and iron binding capacity; Future    Hyperlipidemia LDL goal <100  Discussed cholesterol and start medication to reduce risk of coronary event, recommend to quit smoking too  - Lipid panel reflex to direct LDL Fasting; Future    Moderate major  "depression (H)  Not controlled, start medication and handouts given  - sertraline (ZOLOFT) 50 MG tablet; Take 0.5 tablets (25 mg) by mouth daily for 14 days, THEN 1 tablet (50 mg) daily for 90 days.             Nicotine/Tobacco Cessation:  She reports that she has been smoking cigarettes. She has been smoking an average of 1 pack per day. She has never used smokeless tobacco.  Nicotine/Tobacco Cessation Plan:   Information offered: Patient not interested at this time      BMI:   Estimated body mass index is 30.97 kg/m  as calculated from the following:    Height as of this encounter: 1.607 m (5' 3.25\").    Weight as of this encounter: 79.9 kg (176 lb 3.2 oz).   Weight management plan: diet    See Patient Instructions    Dago Martinez MD  [facility]    Arnoldo Pacheco is a 57 year old, presenting for the following health issues:  Recheck Medication (Follow up labs./)        5/22/2023     1:45 PM   Additional Questions   Roomed by Jyoti Chavira MA   Accompanied by Self         5/22/2023     1:45 PM   Patient Reported Additional Medications   Patient reports taking the following new medications Vitamin D3 5,000 units weekly, B12 daily, Reacted magnesium 2 capsules daily, Clearvite daily- detox powder, Tumeric daily- liquid tbls     HPI     Hypertension Follow-up      Do you check your blood pressure regularly outside of the clinic? No     Are you following a low salt diet? Yes    Are your blood pressures ever more than 140 on the top number (systolic) OR more   than 90 on the bottom number (diastolic), for example 140/90? Yes    Hypothyroidism Follow-up      Since last visit, patient describes the following symptoms: weight gain of 10 lbs, anxiety, depression and hair loss    Follow up labs. Patient had labs done by nutritionist. Patient brought labs here a week ago for Dr. Martinez to review. Patient started regimen from the Nutritionist from [employer].    Patient is going to an out of pocket clinic for neuropathy.  They did a bunch of labs and told to follow up here.  Her TSH is elevated, low vitamin d, low vitamin b12, macrocytosis etc.  I scanned the lab sheet into Epic.  She is not eating a well balanced diet.  She is using alcohol daily.  She is depressed.  She is crying and irritable. Reviewed PHQ9 and Gad7.  There is stress within her family, mom is failing and siblings are fighting.  She is starting vitamin d replacement, magnesium replacement, b12 replacement, and other supplement medications.            Review of Systems         Objective    /66   Pulse 106   Temp 98.4  F (36.9  C) (Tympanic)   Resp 20   Ht 1.607 m (5' 3.25\")   Wt 79.9 kg (176 lb 3.2 oz)   LMP 03/12/2011   SpO2 95%   BMI 30.97 kg/m    Body mass index is 30.97 kg/m .  Physical Exam   GENERAL APPEARANCE: alert, no distress and cooperative  RESP: lungs clear to auscultation - no rales, rhonchi or wheezes  CV: regular rates and rhythm, normal S1 S2, no S3 or S4 and no murmur, click or rub  ABDOMEN: soft, nontender, without hepatosplenomegaly or masses and bowel sounds normal  MS: extremities normal- no gross deformities noted  SKIN: no suspicious lesions or rashes  NEURO: Normal strength and tone, mentation intact and speech normal  PSYCH: mentation appears normal, crying and fatigued      Time reviewing labs, chart, time with patient and documentation is 50 minutes today the day of the appointment.              "

## 2023-05-23 LAB — VIT B12 SERPL-MCNC: 1496 PG/ML (ref 232–1245)

## 2023-05-27 LAB
PATH REPORT.COMMENTS IMP SPEC: NORMAL
PATH REPORT.FINAL DX SPEC: NORMAL
PATH REPORT.MICROSCOPIC SPEC OTHER STN: NORMAL
PATH REPORT.MICROSCOPIC SPEC OTHER STN: NORMAL
PATH REPORT.RELEVANT HX SPEC: NORMAL

## 2023-06-15 ENCOUNTER — TELEPHONE (OUTPATIENT)
Dept: FAMILY MEDICINE | Facility: CLINIC | Age: 58
End: 2023-06-15
Payer: COMMERCIAL

## 2023-06-15 NOTE — LETTER
Silvia 15, 2023    To  Tara Charlton  [address] [place] [address]    Your team at [facility] cares about your health. We have reviewed your chart and based on our findings; we are making the following recommendations to better manage your health.     You are in particular need of attention regarding the following:     Schedule Annual MAMMOGRAPHY. [facility] scheduling number is [phone] or schedule in MyChart (self referral).  1 in 8 women will develop invasive breast cancer during her lifetime and it is the most common non-skin cancer in [place]. EARLY detection, new treatments, and a better understanding of the disease have increased survival rates- the 5 year survival rate in the 1960's was 63% and today it is close to 90%.  Schedule a primary care office visit with your provider for a Pap Smear to screen for Cervical Cancer.  Call or MyChart message your clinic to schedule a colonoscopy, schedule/ a FIT Test, or order a Cologuard test. If you are unsure what type of test you need, please call your clinic and speak to clinic staff.   Colon cancer is now the second leading cause of cancer-related deaths in the [place] for both men and women and there are over 130,000 new cases and 50,000 deaths per year from colon cancer. Colonoscopies can prevent 90-95% of these deaths. Problem lesions can be removed before they ever become cancer. This test is not only looking for cancer, but also getting rid of precancerous lesions.   Please call [phone] to schedule a colonoscopy.  Contact your clinic to schedule/ your FIT Test.     If you have already completed these items, please contact the clinic via phone or   [email or web address] so your care team can review and update your records. Thank you for   choosing [facility] for your healthcare needs. For any questions,   concerns, or to schedule an appointment please contact our clinic.    Healthy  Regards,      Your [facility] Team

## 2023-07-14 DIAGNOSIS — F32.1 MODERATE MAJOR DEPRESSION (H): ICD-10-CM

## 2023-07-19 ENCOUNTER — LAB (OUTPATIENT)
Dept: LAB | Facility: CLINIC | Age: 58
End: 2023-07-19
Payer: COMMERCIAL

## 2023-07-19 DIAGNOSIS — Z11.4 SCREENING FOR HIV (HUMAN IMMUNODEFICIENCY VIRUS): Primary | ICD-10-CM

## 2023-07-19 DIAGNOSIS — R74.8 ELEVATED LIVER ENZYMES: ICD-10-CM

## 2023-07-19 DIAGNOSIS — G62.9 PERIPHERAL POLYNEUROPATHY: ICD-10-CM

## 2023-07-19 DIAGNOSIS — E03.9 HYPOTHYROIDISM, UNSPECIFIED TYPE: ICD-10-CM

## 2023-07-19 DIAGNOSIS — E78.5 HYPERLIPIDEMIA LDL GOAL <100: ICD-10-CM

## 2023-07-19 DIAGNOSIS — D75.89 MACROCYTOSIS WITHOUT ANEMIA: ICD-10-CM

## 2023-07-19 DIAGNOSIS — R79.89 ABNORMAL THYROID BLOOD TEST: Primary | ICD-10-CM

## 2023-07-19 DIAGNOSIS — E83.42 HYPOMAGNESEMIA: ICD-10-CM

## 2023-07-19 DIAGNOSIS — Z11.59 NEED FOR HEPATITIS C SCREENING TEST: ICD-10-CM

## 2023-07-19 DIAGNOSIS — R79.89 LOW VITAMIN D LEVEL: ICD-10-CM

## 2023-07-19 LAB
ALBUMIN SERPL BCG-MCNC: 3.6 G/DL (ref 3.5–5.2)
ALP SERPL-CCNC: 112 U/L (ref 35–104)
ALT SERPL W P-5'-P-CCNC: 22 U/L (ref 0–50)
ANION GAP SERPL CALCULATED.3IONS-SCNC: 13 MMOL/L (ref 7–15)
AST SERPL W P-5'-P-CCNC: 31 U/L (ref 0–45)
BASOPHILS # BLD AUTO: 0 10E3/UL (ref 0–0.2)
BASOPHILS NFR BLD AUTO: 0 %
BILIRUB SERPL-MCNC: 0.4 MG/DL
BUN SERPL-MCNC: 9.8 MG/DL (ref 8–23)
CALCIUM SERPL-MCNC: 10 MG/DL (ref 8.6–10)
CHLORIDE SERPL-SCNC: 101 MMOL/L (ref 98–107)
CHOLEST SERPL-MCNC: 250 MG/DL
CREAT SERPL-MCNC: 1.03 MG/DL (ref 0.51–0.95)
DEPRECATED CALCIDIOL+CALCIFEROL SERPL-MC: 74 UG/L (ref 20–75)
DEPRECATED HCO3 PLAS-SCNC: 27 MMOL/L (ref 22–29)
EOSINOPHIL # BLD AUTO: 0.1 10E3/UL (ref 0–0.7)
EOSINOPHIL NFR BLD AUTO: 1 %
ERYTHROCYTE [DISTWIDTH] IN BLOOD BY AUTOMATED COUNT: 16.7 % (ref 10–15)
GFR SERPL CREATININE-BSD FRML MDRD: 62 ML/MIN/1.73M2
GLUCOSE SERPL-MCNC: 105 MG/DL (ref 70–99)
HCT VFR BLD AUTO: 50.9 % (ref 35–47)
HCV AB SERPL QL IA: NONREACTIVE
HDLC SERPL-MCNC: 67 MG/DL
HGB BLD-MCNC: 16.2 G/DL (ref 11.7–15.7)
HIV 1+2 AB+HIV1 P24 AG SERPL QL IA: NONREACTIVE
IMM GRANULOCYTES # BLD: 0 10E3/UL
IMM GRANULOCYTES NFR BLD: 0 %
LDLC SERPL CALC-MCNC: 148 MG/DL
LYMPHOCYTES # BLD AUTO: 2.2 10E3/UL (ref 0.8–5.3)
LYMPHOCYTES NFR BLD AUTO: 22 %
MAGNESIUM SERPL-MCNC: 1.6 MG/DL (ref 1.7–2.3)
MCH RBC QN AUTO: 32 PG (ref 26.5–33)
MCHC RBC AUTO-ENTMCNC: 31.8 G/DL (ref 31.5–36.5)
MCV RBC AUTO: 101 FL (ref 78–100)
MONOCYTES # BLD AUTO: 0.8 10E3/UL (ref 0–1.3)
MONOCYTES NFR BLD AUTO: 8 %
NEUTROPHILS # BLD AUTO: 7 10E3/UL (ref 1.6–8.3)
NEUTROPHILS NFR BLD AUTO: 68 %
NONHDLC SERPL-MCNC: 183 MG/DL
PLATELET # BLD AUTO: 328 10E3/UL (ref 150–450)
POTASSIUM SERPL-SCNC: 3.3 MMOL/L (ref 3.4–5.3)
PROT SERPL-MCNC: 7.4 G/DL (ref 6.4–8.3)
RBC # BLD AUTO: 5.06 10E6/UL (ref 3.8–5.2)
SODIUM SERPL-SCNC: 141 MMOL/L (ref 136–145)
T4 FREE SERPL-MCNC: 1.82 NG/DL (ref 0.9–1.7)
TRIGL SERPL-MCNC: 176 MG/DL
TSH SERPL DL<=0.005 MIU/L-ACNC: 2.87 UIU/ML (ref 0.3–4.2)
WBC # BLD AUTO: 10.3 10E3/UL (ref 4–11)

## 2023-07-19 PROCEDURE — 85025 COMPLETE CBC W/AUTO DIFF WBC: CPT

## 2023-07-19 PROCEDURE — 36415 COLL VENOUS BLD VENIPUNCTURE: CPT

## 2023-07-19 PROCEDURE — 82306 VITAMIN D 25 HYDROXY: CPT

## 2023-07-19 PROCEDURE — 80053 COMPREHEN METABOLIC PANEL: CPT

## 2023-07-19 PROCEDURE — 84439 ASSAY OF FREE THYROXINE: CPT

## 2023-07-19 PROCEDURE — 83735 ASSAY OF MAGNESIUM: CPT

## 2023-07-19 PROCEDURE — 84443 ASSAY THYROID STIM HORMONE: CPT

## 2023-07-19 PROCEDURE — 86803 HEPATITIS C AB TEST: CPT

## 2023-07-19 PROCEDURE — 80061 LIPID PANEL: CPT

## 2023-07-19 PROCEDURE — 87389 HIV-1 AG W/HIV-1&-2 AB AG IA: CPT

## 2023-07-20 ENCOUNTER — TELEPHONE (OUTPATIENT)
Dept: FAMILY MEDICINE | Facility: CLINIC | Age: 58
End: 2023-07-20
Payer: COMMERCIAL

## 2023-07-21 ENCOUNTER — OFFICE VISIT (OUTPATIENT)
Dept: FAMILY MEDICINE | Facility: CLINIC | Age: 58
End: 2023-07-21
Payer: COMMERCIAL

## 2023-07-21 ENCOUNTER — LAB (OUTPATIENT)
Dept: FAMILY MEDICINE | Facility: CLINIC | Age: 58
End: 2023-07-21

## 2023-07-21 VITALS
SYSTOLIC BLOOD PRESSURE: 139 MMHG | WEIGHT: 166 LBS | HEART RATE: 95 BPM | RESPIRATION RATE: 24 BRPM | TEMPERATURE: 98.4 F | HEIGHT: 63 IN | OXYGEN SATURATION: 94 % | DIASTOLIC BLOOD PRESSURE: 80 MMHG | BODY MASS INDEX: 29.41 KG/M2

## 2023-07-21 DIAGNOSIS — Z12.11 SCREEN FOR COLON CANCER: ICD-10-CM

## 2023-07-21 DIAGNOSIS — G62.9 PERIPHERAL POLYNEUROPATHY: ICD-10-CM

## 2023-07-21 DIAGNOSIS — Z00.00 ROUTINE GENERAL MEDICAL EXAMINATION AT A HEALTH CARE FACILITY: Primary | ICD-10-CM

## 2023-07-21 DIAGNOSIS — E78.5 HYPERLIPIDEMIA LDL GOAL <100: ICD-10-CM

## 2023-07-21 DIAGNOSIS — Z12.31 VISIT FOR SCREENING MAMMOGRAM: ICD-10-CM

## 2023-07-21 DIAGNOSIS — Z12.4 CERVICAL CANCER SCREENING: ICD-10-CM

## 2023-07-21 DIAGNOSIS — R79.0 LOW MAGNESIUM LEVEL: ICD-10-CM

## 2023-07-21 DIAGNOSIS — F32.1 MODERATE MAJOR DEPRESSION (H): ICD-10-CM

## 2023-07-21 PROCEDURE — 99214 OFFICE O/P EST MOD 30 MIN: CPT | Mod: 25 | Performed by: FAMILY MEDICINE

## 2023-07-21 PROCEDURE — G0145 SCR C/V CYTO,THINLAYER,RESCR: HCPCS | Performed by: FAMILY MEDICINE

## 2023-07-21 PROCEDURE — 99396 PREV VISIT EST AGE 40-64: CPT | Performed by: FAMILY MEDICINE

## 2023-07-21 PROCEDURE — 87624 HPV HI-RISK TYP POOLED RSLT: CPT | Performed by: FAMILY MEDICINE

## 2023-07-21 RX ORDER — MAGNESIUM OXIDE 400 MG/1
400 TABLET ORAL DAILY
Qty: 90 TABLET | Refills: 3 | Status: SHIPPED | OUTPATIENT
Start: 2023-07-21

## 2023-07-21 RX ORDER — GABAPENTIN 300 MG/1
300 CAPSULE ORAL 3 TIMES DAILY
Qty: 90 CAPSULE | Refills: 3 | Status: SHIPPED | OUTPATIENT
Start: 2023-07-21 | End: 2023-07-31

## 2023-07-21 ASSESSMENT — ENCOUNTER SYMPTOMS
CHILLS: 0
SORE THROAT: 0
JOINT SWELLING: 0
FEVER: 0
HEARTBURN: 0
HEMATOCHEZIA: 0
MYALGIAS: 0
NAUSEA: 0
WEAKNESS: 1
PALPITATIONS: 0
EYE PAIN: 0
ABDOMINAL PAIN: 0
ARTHRALGIAS: 0
COUGH: 0
DYSURIA: 0
DIZZINESS: 0
HEMATURIA: 0
HEADACHES: 0
SHORTNESS OF BREATH: 0
CONSTIPATION: 0
NERVOUS/ANXIOUS: 0
BREAST MASS: 0
PARESTHESIAS: 0
DIARRHEA: 1
FREQUENCY: 0

## 2023-07-21 ASSESSMENT — PATIENT HEALTH QUESTIONNAIRE - PHQ9
SUM OF ALL RESPONSES TO PHQ QUESTIONS 1-9: 8
SUM OF ALL RESPONSES TO PHQ QUESTIONS 1-9: 8

## 2023-07-21 ASSESSMENT — ANXIETY QUESTIONNAIRES
3. WORRYING TOO MUCH ABOUT DIFFERENT THINGS: NOT AT ALL
GAD7 TOTAL SCORE: 2
GAD7 TOTAL SCORE: 2
4. TROUBLE RELAXING: NOT AT ALL
2. NOT BEING ABLE TO STOP OR CONTROL WORRYING: NOT AT ALL
5. BEING SO RESTLESS THAT IT IS HARD TO SIT STILL: NOT AT ALL
7. FEELING AFRAID AS IF SOMETHING AWFUL MIGHT HAPPEN: NOT AT ALL
1. FEELING NERVOUS, ANXIOUS, OR ON EDGE: SEVERAL DAYS
6. BECOMING EASILY ANNOYED OR IRRITABLE: SEVERAL DAYS

## 2023-07-21 ASSESSMENT — PAIN SCALES - GENERAL: PAINLEVEL: NO PAIN (0)

## 2023-07-21 NOTE — PROGRESS NOTES
SUBJECTIVE:   CC: Tara is an 57 year old who presents for preventive health visit.       7/21/2023     8:33 AM   Additional Questions   Roomed by Little     Healthy Habits:     Getting at least 3 servings of Calcium per day:  Yes    Bi-annual eye exam:  Yes    Dental care twice a year:  Yes    Sleep apnea or symptoms of sleep apnea:  None    Diet:  Regular (no restrictions)    Frequency of exercise:  None    Taking medications regularly:  Yes    Medication side effects:  None    Additional concerns today:  Yes    Chief Complaint   Patient presents with    Physical    NEUROPATHY     Depression Followup  How are you doing with your depression since your last visit? No change  Are you having other symptoms that might be associated with depression? No  Have you had a significant life event?  Teeth problems   Are you feeling anxious or having panic attacks?   No  Do you have any concerns with your use of alcohol or other drugs? Yes:  alcohol sometimes    Social History     Tobacco Use    Smoking status: Every Day     Packs/day: 1.00     Types: Cigarettes     Passive exposure: Never    Smokeless tobacco: Never    Tobacco comments:     Ins wont cover Chantix   Vaping Use    Vaping Use: Never used   Substance Use Topics    Alcohol use: Yes     Comment: couple drinks 2-3 times a week    Drug use: No         5/22/2023     2:43 PM 7/21/2023     9:30 AM   PHQ   PHQ-9 Total Score 17 8   Q9: Thoughts of better off dead/self-harm past 2 weeks Not at all Not at all         5/22/2023     2:43 PM 7/21/2023     9:31 AM   NIKOLAS-7 SCORE   Total Score  2 (minimal anxiety)   Total Score 18 2         7/21/2023     9:30 AM   Last PHQ-9   1.  Little interest or pleasure in doing things 1   2.  Feeling down, depressed, or hopeless 1   3.  Trouble falling or staying asleep, or sleeping too much 3   4.  Feeling tired or having little energy 3   5.  Poor appetite or overeating 0   6.  Feeling bad about yourself 0   7.  Trouble concentrating 0    8.  Moving slowly or restless 0   Q9: Thoughts of better off dead/self-harm past 2 weeks 0   PHQ-9 Total Score 8         7/21/2023     9:31 AM   NIKOLAS-7    1. Feeling nervous, anxious, or on edge 1   2. Not being able to stop or control worrying 0   3. Worrying too much about different things 0   4. Trouble relaxing 0   5. Being so restless that it is hard to sit still 0   6. Becoming easily annoyed or irritable 1   7. Feeling afraid, as if something awful might happen 0   NIKOLAS-7 Total Score 2       Suicide Assessment Five-step Evaluation and Treatment (SAFE-T)        Today's PHQ-9 Score:       7/21/2023     9:30 AM   PHQ-9 SCORE   PHQ-9 Total Score MyChart 8 (Mild depression)   PHQ-9 Total Score 8                   Have you ever done Advance Care Planning? (For example, a Health Directive, POLST, or a discussion with a medical provider or your loved ones about your wishes): No, advance care planning information given to patient to review.  Patient plans to discuss their wishes with loved ones or provider.      Social History     Tobacco Use    Smoking status: Every Day     Packs/day: 1.00     Types: Cigarettes     Passive exposure: Never    Smokeless tobacco: Never    Tobacco comments:     Ins wont cover Chantix   Substance Use Topics    Alcohol use: Yes     Comment: couple drinks 2-3 times a week             7/21/2023     9:43 AM   Alcohol Use   Prescreen: >3 drinks/day or >7 drinks/week? Yes   AUDIT SCORE  9         7/21/2023     9:43 AM   AUDIT - Alcohol Use Disorders Identification Test - Reproduced from the World Health Organization Audit 2001 (Second Edition)   1.  How often do you have a drink containing alcohol? 2 to 4 times a month   2.  How many drinks containing alcohol do you have on a typical day when you are drinking? 3 or 4   3.  How often do you have five or more drinks on one occasion? Monthly   4.  How often during the last year have you found that you were not able to stop drinking once you had  started? Never   5.  How often during the last year have you failed to do what was normally expected of you because of drinking? Never   6.  How often during the last year have you needed a first drink in the morning to get yourself going after a heavy drinking session? Never   7.  How often during the last year have you had a feeling of guilt or remorse after drinking? Never   8.  How often during the last year have you been unable to remember what happened the night before because of your drinking? Never   9.  Have you or someone else been injured because of your drinking? No   10. Has a relative, friend, doctor or other health care worker been concerned about your drinking or suggested you cut down? Yes, during the last year   TOTAL SCORE 9     Reviewed orders with patient.  Reviewed health maintenance and updated orders accordingly - Yes      Breast Cancer Screening:  Any new diagnosis of family breast, ovarian, or bowel cancer? No    FHS-7:        No data to display                Mammogram Screening: Recommended mammography every 1-2 years with patient discussion and risk factor consideration  Pertinent mammograms are reviewed under the imaging tab.    History of abnormal Pap smear: NO - age 30-65 PAP every 5 years with negative HPV co-testing recommended      8/26/2015    12:00 AM   PAP / HPV   PAP (Historical) NIL      Reviewed and updated as needed this visit by clinical staff   Tobacco  Allergies  Meds   Med Hx   Fam Hx          Reviewed and updated as needed this visit by Provider                     Review of Systems   Constitutional:  Negative for chills and fever.   HENT:  Negative for congestion, ear pain, hearing loss and sore throat.    Eyes:  Negative for pain and visual disturbance.   Respiratory:  Negative for cough and shortness of breath.    Cardiovascular:  Negative for chest pain, palpitations and peripheral edema.   Gastrointestinal:  Positive for diarrhea. Negative for abdominal pain,  "constipation, heartburn, hematochezia and nausea.   Breasts:  Negative for tenderness, breast mass and discharge.   Genitourinary:  Negative for dysuria, frequency, genital sores, hematuria, pelvic pain, urgency, vaginal bleeding and vaginal discharge.   Musculoskeletal:  Negative for arthralgias, joint swelling and myalgias.   Skin:  Negative for rash.   Neurological:  Positive for weakness. Negative for dizziness, headaches and paresthesias.   Psychiatric/Behavioral:  Negative for mood changes. The patient is not nervous/anxious.      The 10-year ASCVD risk score (Jose GUILLERMO, et al., 2019) is: 10.4%    Values used to calculate the score:      Age: 57 years      Sex: Female      Is Non- : No      Diabetic: No      Tobacco smoker: Yes      Systolic Blood Pressure: 139 mmHg      Is BP treated: Yes      HDL Cholesterol: 67 mg/dL      Total Cholesterol: 250 mg/dL       OBJECTIVE:   /80   Pulse 95   Temp 98.4  F (36.9  C)   Resp 24   Ht 1.607 m (5' 3.25\")   Wt 75.3 kg (166 lb)   LMP 03/12/2011   SpO2 94%   BMI 29.17 kg/m    Physical Exam  GENERAL: healthy, alert and no distress  EYES: Eyes grossly normal to inspection, PERRL and conjunctivae and sclerae normal  HENT: ear canals and TM's normal, nose and mouth without ulcers or lesions  NECK: no adenopathy, no asymmetry, masses, or scars and thyroid normal to palpation  RESP: lungs clear to auscultation - no rales, rhonchi or wheezes  CV: regular rate and rhythm, normal S1 S2, no S3 or S4, no murmur, click or rub, no peripheral edema and peripheral pulses strong  ABDOMEN: soft, nontender, no hepatosplenomegaly, no masses and bowel sounds normal   (female): normal female external genitalia, normal urethral meatus, vaginal mucosa pink, moist, well rugated, and normal cervix/adnexa/uterus without masses or discharge   (female): nulliparous cervix, difficult to get sample due to retroverted and patient being uncomfortable.  MS: no " gross musculoskeletal defects noted, no edema  SKIN: no suspicious lesions or rashes  NEURO: Normal strength and tone, mentation intact and speech normal  PSYCH: mentation appears normal, affect normal/bright  LYMPH: no cervical adenopathy    Reviewed her recent labs    ASSESSMENT/PLAN:   (Z00.00) Routine general medical examination at a health care facility  (primary encounter diagnosis)  Comment: Discussed healthy lifestyle and preventative cares.    Plan:     (F32.1) Moderate major depression (H)  Comment: on medication, discussed scores.  She wants to continue current dose  Plan: sertraline (ZOLOFT) 50 MG tablet            (Z12.11) Screen for colon cancer  Comment:   Plan: COLOGUARD([employer])            (Z12.4) Cervical cancer screening  Comment: done, more difficult exam  Plan: Pap Screen with HPV - recommended age 30 - 65         years            (Z12.31) Visit for screening mammogram  Comment:   Plan: MA SCREENING DIGITAL BILAT - Future  (s+30)            (R79.0) Low magnesium level  Comment: continue with medication  Plan: magnesium oxide (MAG-OX) 400 MG tablet            (G62.9) Peripheral polyneuropathy  Comment: add new medication to see how this is working  Plan: gabapentin (NEURONTIN) 300 MG capsule            Patient has been advised of split billing requirements and indicates understanding: Yes      COUNSELING:  Reviewed preventive health counseling, as reflected in patient instructions       Regular exercise       Healthy diet/nutrition       Vision screening        She reports that she has been smoking cigarettes. She has been smoking an average of 1 pack per day. She has never been exposed to tobacco smoke. She has never used smokeless tobacco.  Nicotine/Tobacco Cessation Plan:   Information offered: Patient not interested at this time          Dago Martinez MD  [facility]

## 2023-07-21 NOTE — PATIENT INSTRUCTIONS
Check with your insurance as to where you can get your shingles vaccine, either clinic or pharmacy.      I recommend a low cholesterol diet.    Please return the cologuard.    Please get your mammogram done.    Please try the gabapentin and it give it 1 month, if working and it you want to try a higher dose please let me know and we can go to 600 mg at night    Please recheck your thyroid and cholesterol in 3 months.    Please be aware that there will be an additional charge during your preventative visit due to either a new diagnosis and/or chronic disease management.    Preventative visits screen for diseases prior to they occur.  They do not cover for any new diagnosis or chronic disease management which would include medication refills, labs etc.    If you have questions regarding your coverage please check with your insurance provider.  At [facility] we need to code correctly to be in compliance with all insurance companies.        Thank you for choosing [facility].  You may be receiving an email and/or telephone survey request from [facility] [employer] regarding your visit today.  Please take a few minutes to respond to the survey to let us know how we are doing.      If you have questions or concerns, please contact us via SnapNames or you can contact your care team at [phone] option 2.    Our Clinic hours are:  Monday - Thursday 7am-6pm  Friday 7am-5pm    The [place] outpatient lab hours are:  Monday - Friday 7am-4:30pm    Appointments are required, call [phone]    If you have clinical questions after hours or would like to schedule an appointment,  call the clinic at [phone].    Preventive Health Recommendations  Female Ages 50 - 64    Yearly exam: See your health care provider every year in order to  Review health changes.   Discuss preventive care.    Review your medicines if your doctor has prescribed any.    Get a Pap test every three years (unless you have an abnormal result  and your provider advises testing more often).  If you get Pap tests with HPV test, you only need to test every 5 years, unless you have an abnormal result.   You do not need a Pap test if your uterus was removed (hysterectomy) and you have not had cancer.  You should be tested each year for STDs (sexually transmitted diseases) if you're at risk.   Have a mammogram every 1 to 2 years.  Have a colonoscopy at age 50, or have a yearly FIT test (stool test). These exams screen for colon cancer.    Have a cholesterol test every 5 years, or more often if advised.  Have a diabetes test (fasting glucose) every three years. If you are at risk for diabetes, you should have this test more often.   If you are at risk for osteoporosis (brittle bone disease), think about having a bone density scan (DEXA).    Shots: Get a flu shot each year. Get a tetanus shot every 10 years.    Nutrition:   Eat at least 5 servings of fruits and vegetables each day.  Eat whole-grain bread, whole-wheat pasta and brown rice instead of white grains and rice.  Get adequate Calcium and Vitamin D.     Lifestyle  Exercise at least 150 minutes a week (30 minutes a day, 5 days a week). This will help you control your weight and prevent disease.  Limit alcohol to one drink per day.  No smoking.   Wear sunscreen to prevent skin cancer.   See your dentist every six months for an exam and cleaning.  See your eye doctor every 1 to 2 years.

## 2023-07-21 NOTE — LETTER
My Depression Action Plan  Name: Tara Charlton   Date of Birth 1965  Date: 7/21/2023    My doctor: Dago Martinez   My clinic: [facility]  [address] [address]  [phone]            GREEN    ZONE   Good Control    What it looks like:   Things are going generally well. You have normal ups and downs. You may even feel depressed from time to time, but bad moods usually last less than a day.   What you need to do:  Continue to care for yourself (see self care plan)  Check your depression survival kit and update it as needed  Follow your physician s recommendations including any medication.  Do not stop taking medication unless you consult with your physician first.             YELLOW         ZONE Getting Worse    What it looks like:   Depression is starting to interfere with your life.   It may be hard to get out of bed; you may be starting to isolate yourself from others.  Symptoms of depression are starting to last most all day and this has happened for several days.   You may have suicidal thoughts but they are not constant.   What you need to do:     Call your care team. Your response to treatment will improve if you keep your care team informed of your progress. Yellow periods are signs an adjustment may need to be made.     Continue your self-care.  Just get dressed and ready for the day.  Don't give yourself time to talk yourself out of it.    Talk to someone in your support network.    Open up your Depression Self-Care Plan/Wellness Kit.             RED    ZONE Medical Alert - Get Help    What it looks like:   Depression is seriously interfering with your life.   You may experience these or other symptoms: You can t get out of bed most days, can t work or engage in other necessary activities, you have trouble taking care of basic hygiene, or basic responsibilities, thoughts of suicide or death that will not go away, self-injurious  behavior.     What you need to do:  Call your care team and request a same-day appointment. If they are not available (weekends or after hours) call your local crisis line, emergency room or 911.          Depression Self-Care Plan / Wellness Kit    Many people find that medication and therapy are helpful treatments for managing depression. In addition, making small changes to your everyday life can help to boost your mood and improve your wellbeing. Below are some tips for you to consider. Be sure to talk with your medical provider and/or behavioral health consultant if your symptoms are worsening or not improving.     Sleep   Sleep hygiene  means all of the habits that support good, restful sleep. It includes maintaining a consistent bedtime and wake time, using your bedroom only for sleeping or sex, and keeping the bedroom dark and free of distractions like a computer, smartphone, or television.     Develop a Healthy Routine  Maintain good hygiene. Get out of bed in the morning, make your bed, brush your teeth, take a shower, and get dressed. Don t spend too much time viewing media that makes you feel stressed. Find time to relax each day.    Exercise  Get some form of exercise every day. This will help reduce pain and release endorphins, the  feel good  chemicals in your brain. It can be as simple as just going for a walk or doing some gardening, anything that will get you moving.      Diet  Strive to eat healthy foods, including fruits and vegetables. Drink plenty of water. Avoid excessive sugar, caffeine, alcohol, and other mood-altering substances.     Stay Connected with Others  Stay in touch with friends and family members.    Manage Your Mood  Try deep breathing, massage therapy, biofeedback, or meditation. Take part in fun activities when you can. Try to find something to smile about each day.     Psychotherapy  Be open to working with a therapist if your provider recommends it.     Medication  Be sure to  take your medication as prescribed. Most anti-depressants need to be taken every day. It usually takes several weeks for medications to work. Not all medicines work for all people. It is important to follow-up with your provider to make sure you have a treatment plan that is working for you. Do not stop your medication abruptly without first discussing it with your provider.    Crisis Resources   These hotlines are for both adults and children. They and are open 24 hours a day, 7 days a week unless noted otherwise.    National Suicide Prevention Lifeline   988 or 2-678-779-GZGH (1582)    Crisis Text Line    www.crisistextline.org  Text HOME to 657252 from anywhere in the [place], anytime, about any type of crisis. A live, trained crisis counselor will receive the text and respond quickly.    [place] Lifeline for LGBTQ Youth  A national crisis intervention and suicide lifeline for LGBTQ youth under 25. Provides a safe place to talk without judgement. Call 1-596.156.9962; text START to 431459 or visit www.thetrevorproject.org to talk to a trained counselor.    For [facility] crisis numbers, visit the [facility] website at:  https://[place].gov/dhs/people-we-serve/adults/health-care/mental-health/resources/crisis-contacts.jsp

## 2023-07-25 LAB
BKR LAB AP GYN ADEQUACY: NORMAL
BKR LAB AP GYN INTERPRETATION: NORMAL
BKR LAB AP HPV REFLEX: NORMAL
BKR LAB AP LMP: NORMAL
BKR LAB AP PREVIOUS ABNORMAL: NORMAL
PATH REPORT.COMMENTS IMP SPEC: NORMAL
PATH REPORT.COMMENTS IMP SPEC: NORMAL
PATH REPORT.RELEVANT HX SPEC: NORMAL

## 2023-07-27 LAB
HUMAN PAPILLOMA VIRUS 16 DNA: NEGATIVE
HUMAN PAPILLOMA VIRUS 18 DNA: NEGATIVE
HUMAN PAPILLOMA VIRUS FINAL DIAGNOSIS: NORMAL
HUMAN PAPILLOMA VIRUS OTHER HR: NEGATIVE

## 2023-07-31 DIAGNOSIS — I10 ESSENTIAL HYPERTENSION: ICD-10-CM

## 2023-07-31 DIAGNOSIS — F32.1 MODERATE MAJOR DEPRESSION (H): ICD-10-CM

## 2023-07-31 DIAGNOSIS — E03.9 HYPOTHYROIDISM, UNSPECIFIED TYPE: ICD-10-CM

## 2023-07-31 DIAGNOSIS — G62.9 PERIPHERAL POLYNEUROPATHY: ICD-10-CM

## 2023-07-31 NOTE — TELEPHONE ENCOUNTER
Pending Prescriptions:                       Disp   Refills    sertraline (ZOLOFT) 50 MG tablet          90 tab*3            Sig: Take 1 tablet (50 mg) by mouth daily    amLODIPine (NORVASC) 5 MG tablet          90 tab*3            Sig: Take 1 tablet (5 mg) by mouth daily    metoprolol succinate ER (TOPROL XL) 100 M*90 tab*3            Sig: Take 1 tablet (100 mg) by mouth daily    levothyroxine (SYNTHROID/LEVOTHROID) 100 *90 tab*1            Sig: Take 1 tablet (100 mcg) by mouth daily    gabapentin (NEURONTIN) 300 MG capsule     90 cap*3            Sig: Take 1 capsule (300 mg) by mouth 3 times daily    Patient's previous pharmacy [facility]  would not fill prescriptions due to change in insurance. Please send these prescriptions to [facility]. Roberta Martinez on 7/31/2023 at 9:46 AM

## 2023-08-01 RX ORDER — LEVOTHYROXINE SODIUM 100 UG/1
100 TABLET ORAL DAILY
Qty: 90 TABLET | Refills: 0 | Status: SHIPPED | OUTPATIENT
Start: 2023-08-01 | End: 2023-10-09

## 2023-08-01 RX ORDER — METOPROLOL SUCCINATE 100 MG/1
100 TABLET, EXTENDED RELEASE ORAL DAILY
Qty: 90 TABLET | Refills: 2 | Status: SHIPPED | OUTPATIENT
Start: 2023-08-01 | End: 2024-05-15

## 2023-08-01 RX ORDER — AMLODIPINE BESYLATE 5 MG/1
5 TABLET ORAL DAILY
Qty: 90 TABLET | Refills: 2 | Status: SHIPPED | OUTPATIENT
Start: 2023-08-01 | End: 2024-05-15

## 2023-08-01 NOTE — TELEPHONE ENCOUNTER
Pt needs meds sent to new pharmacy-resent all but gabapentin with adjusted quantities      Pt needs gabapentin sent by provider      Hilario Hernandez RN

## 2023-08-02 RX ORDER — GABAPENTIN 300 MG/1
300 CAPSULE ORAL 3 TIMES DAILY
Qty: 90 CAPSULE | Refills: 3 | Status: SHIPPED | OUTPATIENT
Start: 2023-08-02 | End: 2023-10-25

## 2023-10-04 ENCOUNTER — TELEPHONE (OUTPATIENT)
Dept: FAMILY MEDICINE | Facility: CLINIC | Age: 58
End: 2023-10-04
Payer: COMMERCIAL

## 2023-10-04 NOTE — LETTER
October 4, 2023    To  Tara Charlton  [address] [place] [address]    Your team at [facility] cares about your health. We have reviewed your chart and based on our findings; we are making the following recommendations to better manage your health.     You are in particular need of attention regarding the following:     Call or MyChart message your clinic to schedule a colonoscopy, schedule/ a FIT Test, or order a Cologuard test. If you are unsure what type of test you need, please call your clinic and speak to clinic staff.   Colon cancer is now the second leading cause of cancer-related deaths in the [place] for both men and women and there are over 130,000 new cases and 50,000 deaths per year from colon cancer. Colonoscopies can prevent 90-95% of these deaths. Problem lesions can be removed before they ever become cancer. This test is not only looking for cancer, but also getting rid of precancerous lesions.   Please call [phone] to schedule a colonoscopy.  Contact your clinic to schedule/ your FIT Test.   Schedule an office visit with your provider if you are interested in completing your colon cancer screening with a Cologuard test    If you have already completed these items, please contact the clinic via phone or   [employer] so your care team can review and update your records. Thank you for   choosing [facility] for your healthcare needs. For any questions,   concerns, or to schedule an appointment please contact our clinic.    Healthy Regards,      Your [facility] Care Team

## 2023-10-04 NOTE — TELEPHONE ENCOUNTER
Patient Quality Outreach    Patient is due for the following:   Colon Cancer Screening    Next Steps:   No follow up needed at this time.    Type of outreach:    Sent letter.      Questions for provider review:    None           Patricia Negron, CMA

## 2023-10-06 ENCOUNTER — TELEPHONE (OUTPATIENT)
Dept: FAMILY MEDICINE | Facility: CLINIC | Age: 58
End: 2023-10-06
Payer: COMMERCIAL

## 2023-10-06 DIAGNOSIS — E03.9 HYPOTHYROIDISM, UNSPECIFIED TYPE: ICD-10-CM

## 2023-10-06 NOTE — LETTER
October 6, 2023    To  Tara Charlton  [address] [address]    Your team at [facility] cares about your health. We have reviewed your chart and based on our findings; we are making the following recommendations to better manage your health.     You are in particular need of attention regarding the following:     Schedule Annual MAMMOGRAPHY. [facility] scheduling number is [phone] or schedule in [email or web address] (self referral).  1 in 8 women will develop invasive breast cancer during her lifetime and it is the most common non-skin cancer in [place]. EARLY detection, new treatments, and a better understanding of the disease have increased survival rates- the 5 year survival rate in the 1960's was 63% and today it is close to 90%.    If you have already completed these items, please contact the clinic via phone or   [email or web address] so your care team can review and update your records. Thank you for   choosing [facility] for your healthcare needs. For any questions,   concerns, or to schedule an appointment please contact our clinic.    Healthy Regards,      Your [facility] Care Team          Sincerely,        Dago Martinez MD

## 2023-10-06 NOTE — TELEPHONE ENCOUNTER
Patient Quality Outreach    Patient is due for the following:   Breast Cancer Screening - Mammogram    Next Steps:   No follow up needed at this time.    Type of outreach:    Sent letter.      Questions for provider review:    None           Patricia Negron, CMA

## 2023-10-09 RX ORDER — LEVOTHYROXINE SODIUM 100 UG/1
100 TABLET ORAL DAILY
Qty: 90 TABLET | Refills: 1 | Status: SHIPPED | OUTPATIENT
Start: 2023-10-09 | End: 2024-05-15

## 2023-10-23 ENCOUNTER — LAB (OUTPATIENT)
Dept: LAB | Facility: CLINIC | Age: 58
End: 2023-10-23
Payer: COMMERCIAL

## 2023-10-23 DIAGNOSIS — R79.89 ABNORMAL THYROID BLOOD TEST: ICD-10-CM

## 2023-10-23 DIAGNOSIS — E78.5 HYPERLIPIDEMIA LDL GOAL <100: ICD-10-CM

## 2023-10-23 LAB
CHOLEST SERPL-MCNC: 242 MG/DL
HDLC SERPL-MCNC: 56 MG/DL
LDLC SERPL CALC-MCNC: 150 MG/DL
NONHDLC SERPL-MCNC: 186 MG/DL
T4 FREE SERPL-MCNC: 1.57 NG/DL (ref 0.9–1.7)
TRIGL SERPL-MCNC: 181 MG/DL
TSH SERPL DL<=0.005 MIU/L-ACNC: 3.52 UIU/ML (ref 0.3–4.2)

## 2023-10-23 PROCEDURE — 84439 ASSAY OF FREE THYROXINE: CPT

## 2023-10-23 PROCEDURE — 36415 COLL VENOUS BLD VENIPUNCTURE: CPT

## 2023-10-23 PROCEDURE — 84443 ASSAY THYROID STIM HORMONE: CPT

## 2023-10-23 PROCEDURE — 80061 LIPID PANEL: CPT

## 2023-10-24 DIAGNOSIS — G62.9 PERIPHERAL POLYNEUROPATHY: ICD-10-CM

## 2023-10-25 RX ORDER — GABAPENTIN 300 MG/1
CAPSULE ORAL
Qty: 90 CAPSULE | Refills: 11 | Status: SHIPPED | OUTPATIENT
Start: 2023-10-25 | End: 2024-05-15

## 2024-02-26 ENCOUNTER — TELEPHONE (OUTPATIENT)
Dept: FAMILY MEDICINE | Facility: CLINIC | Age: 59
End: 2024-02-26
Payer: COMMERCIAL

## 2024-02-26 NOTE — LETTER
February 26, 2024      Tara Charlton  [address] [place] [address]        Dear Tara,   Your team at [facility] cares about your health. We have reviewed your chart and based on our findings; we are making the following recommendations to better manage your health.     You are in particular need of attention regarding the following:   Mammogram  Colon cancer screening, a cologard was ordered 7/21/23, please complete the kit that was mailed to you and return, if you need a new kit please contact the clinic.    Schedule Annual MAMMOGRAPHY. [facility] scheduling number is [phone] or schedule in [email or web address] (self referral).  Colon cancer is now the second leading cause of cancer-related deaths in the [place] for both men and women and there are over 130,000 new cases and 50,000 deaths per year from colon cancer. Colonoscopies can prevent 90-95% of these deaths. Problem lesions can be removed before they ever become cancer. This test is not only looking for cancer, but also getting rid of precancerous lesions.   If you are under/uninsured, we recommend you contact the [employer] Program.[employer] is a free colorectal cancer screening program that provides colonoscopies for eligible under/uninsured [place] men and women. If you are interested in receiving a free colonoscopy, please call [employer] at t [phone] (mention code [employer]) to see if you're eligible. Please have them send us the results.     If you have already completed these items, please contact the clinic via phone or   [email or web address] so your care team can review and update your records. Thank you for   choosing [facility] for your healthcare needs. For any questions,   concerns, or to schedule an appointment please contact our clinic.    Healthy Regards,      Your [facility] Care Team

## 2024-02-26 NOTE — TELEPHONE ENCOUNTER
Patient Quality Outreach    Patient is due for the following:   Colon Cancer Screening  Breast Cancer Screening - Mammogram    Next Steps:   Schedule mammogram, cologard ordered 7/21/23    Type of outreach:    Sent letter.      Questions for provider review:    None           Josiane Rayo, CMA

## 2024-03-06 NOTE — TELEPHONE ENCOUNTER
Writer called and talked with Pt to help get Pt scheduled for a new GI appointment. Pt accepted an appointment with Puma Hannah on 4/4/2024 at 4 PM for an in-person clinic visit.    Yes, I am sorry, lets recheck BMP one more time to ensure electrolytes remain stable.    ANT Hoffman CNP

## 2024-05-08 ASSESSMENT — ANXIETY QUESTIONNAIRES
2. NOT BEING ABLE TO STOP OR CONTROL WORRYING: MORE THAN HALF THE DAYS
5. BEING SO RESTLESS THAT IT IS HARD TO SIT STILL: NOT AT ALL
GAD7 TOTAL SCORE: 14
4. TROUBLE RELAXING: SEVERAL DAYS
IF YOU CHECKED OFF ANY PROBLEMS ON THIS QUESTIONNAIRE, HOW DIFFICULT HAVE THESE PROBLEMS MADE IT FOR YOU TO DO YOUR WORK, TAKE CARE OF THINGS AT HOME, OR GET ALONG WITH OTHER PEOPLE: SOMEWHAT DIFFICULT
3. WORRYING TOO MUCH ABOUT DIFFERENT THINGS: MORE THAN HALF THE DAYS
GAD7 TOTAL SCORE: 14
1. FEELING NERVOUS, ANXIOUS, OR ON EDGE: NEARLY EVERY DAY
6. BECOMING EASILY ANNOYED OR IRRITABLE: NEARLY EVERY DAY
7. FEELING AFRAID AS IF SOMETHING AWFUL MIGHT HAPPEN: NEARLY EVERY DAY
8. IF YOU CHECKED OFF ANY PROBLEMS, HOW DIFFICULT HAVE THESE MADE IT FOR YOU TO DO YOUR WORK, TAKE CARE OF THINGS AT HOME, OR GET ALONG WITH OTHER PEOPLE?: SOMEWHAT DIFFICULT
7. FEELING AFRAID AS IF SOMETHING AWFUL MIGHT HAPPEN: NEARLY EVERY DAY

## 2024-05-15 ENCOUNTER — OFFICE VISIT (OUTPATIENT)
Dept: FAMILY MEDICINE | Facility: CLINIC | Age: 59
End: 2024-05-15
Payer: COMMERCIAL

## 2024-05-15 VITALS
OXYGEN SATURATION: 93 % | BODY MASS INDEX: 31.25 KG/M2 | HEART RATE: 89 BPM | RESPIRATION RATE: 16 BRPM | WEIGHT: 177.8 LBS | TEMPERATURE: 98.5 F | SYSTOLIC BLOOD PRESSURE: 130 MMHG | DIASTOLIC BLOOD PRESSURE: 78 MMHG

## 2024-05-15 DIAGNOSIS — E03.9 HYPOTHYROIDISM, UNSPECIFIED TYPE: ICD-10-CM

## 2024-05-15 DIAGNOSIS — E78.5 HYPERLIPIDEMIA LDL GOAL <100: ICD-10-CM

## 2024-05-15 DIAGNOSIS — I10 ESSENTIAL HYPERTENSION: Primary | ICD-10-CM

## 2024-05-15 DIAGNOSIS — F32.1 MODERATE MAJOR DEPRESSION (H): ICD-10-CM

## 2024-05-15 DIAGNOSIS — D17.30 LIPOMA OF SKIN AND SUBCUTANEOUS TISSUE: ICD-10-CM

## 2024-05-15 DIAGNOSIS — I10 ESSENTIAL HYPERTENSION: ICD-10-CM

## 2024-05-15 LAB
ALBUMIN SERPL BCG-MCNC: 4 G/DL (ref 3.5–5.2)
ALP SERPL-CCNC: 114 U/L (ref 40–150)
ALT SERPL W P-5'-P-CCNC: 15 U/L (ref 0–50)
ANION GAP SERPL CALCULATED.3IONS-SCNC: 15 MMOL/L (ref 7–15)
AST SERPL W P-5'-P-CCNC: 22 U/L (ref 0–45)
BILIRUB SERPL-MCNC: 0.6 MG/DL
BUN SERPL-MCNC: 11.3 MG/DL (ref 8–23)
CALCIUM SERPL-MCNC: 10 MG/DL (ref 8.8–10.2)
CHLORIDE SERPL-SCNC: 98 MMOL/L (ref 98–107)
CHOLEST SERPL-MCNC: 263 MG/DL
CREAT SERPL-MCNC: 0.87 MG/DL (ref 0.51–0.95)
DEPRECATED HCO3 PLAS-SCNC: 24 MMOL/L (ref 22–29)
EGFRCR SERPLBLD CKD-EPI 2021: 76 ML/MIN/1.73M2
FASTING STATUS PATIENT QL REPORTED: NO
FASTING STATUS PATIENT QL REPORTED: NO
GLUCOSE SERPL-MCNC: 141 MG/DL (ref 70–99)
HDLC SERPL-MCNC: 60 MG/DL
LDLC SERPL CALC-MCNC: 168 MG/DL
NONHDLC SERPL-MCNC: 203 MG/DL
POTASSIUM SERPL-SCNC: 4.3 MMOL/L (ref 3.4–5.3)
PROT SERPL-MCNC: 7.6 G/DL (ref 6.4–8.3)
SODIUM SERPL-SCNC: 137 MMOL/L (ref 135–145)
T4 FREE SERPL-MCNC: 1.34 NG/DL (ref 0.9–1.7)
TRIGL SERPL-MCNC: 174 MG/DL
TSH SERPL DL<=0.005 MIU/L-ACNC: 5.53 UIU/ML (ref 0.3–4.2)

## 2024-05-15 PROCEDURE — 80061 LIPID PANEL: CPT | Performed by: NURSE PRACTITIONER

## 2024-05-15 PROCEDURE — 36415 COLL VENOUS BLD VENIPUNCTURE: CPT | Performed by: NURSE PRACTITIONER

## 2024-05-15 PROCEDURE — 90677 PCV20 VACCINE IM: CPT | Performed by: NURSE PRACTITIONER

## 2024-05-15 PROCEDURE — 90471 IMMUNIZATION ADMIN: CPT | Performed by: NURSE PRACTITIONER

## 2024-05-15 PROCEDURE — 84439 ASSAY OF FREE THYROXINE: CPT | Performed by: NURSE PRACTITIONER

## 2024-05-15 PROCEDURE — 80053 COMPREHEN METABOLIC PANEL: CPT | Performed by: NURSE PRACTITIONER

## 2024-05-15 PROCEDURE — 84443 ASSAY THYROID STIM HORMONE: CPT | Performed by: NURSE PRACTITIONER

## 2024-05-15 PROCEDURE — 99214 OFFICE O/P EST MOD 30 MIN: CPT | Mod: 25 | Performed by: NURSE PRACTITIONER

## 2024-05-15 RX ORDER — METOPROLOL SUCCINATE 100 MG/1
100 TABLET, EXTENDED RELEASE ORAL DAILY
Qty: 90 TABLET | Refills: 3 | Status: SHIPPED | OUTPATIENT
Start: 2024-05-15 | End: 2024-05-15

## 2024-05-15 RX ORDER — LEVOTHYROXINE SODIUM 100 UG/1
100 TABLET ORAL DAILY
Qty: 90 TABLET | Refills: 3 | Status: SHIPPED | OUTPATIENT
Start: 2024-05-15 | End: 2024-05-15

## 2024-05-15 RX ORDER — AMLODIPINE BESYLATE 5 MG/1
5 TABLET ORAL DAILY
Qty: 90 TABLET | Refills: 3 | Status: SHIPPED | OUTPATIENT
Start: 2024-05-15 | End: 2024-05-15

## 2024-05-15 ASSESSMENT — PATIENT HEALTH QUESTIONNAIRE - PHQ9: SUM OF ALL RESPONSES TO PHQ QUESTIONS 1-9: 0

## 2024-05-15 ASSESSMENT — PAIN SCALES - GENERAL: PAINLEVEL: NO PAIN (0)

## 2024-05-15 NOTE — TELEPHONE ENCOUNTER
Patient was seen today, prescription were sent to wrong mail order, places resend to [facility] mailorder pharmacy.      Kalpana VAZQUEZ  Station

## 2024-05-15 NOTE — PROGRESS NOTES
Assessment & Plan     Essential hypertension  -BP well controlled   - metoprolol succinate ER (TOPROL XL) 100 MG 24 hr tablet; Take 1 tablet (100 mg) by mouth daily  - amLODIPine (NORVASC) 5 MG tablet; Take 1 tablet (5 mg) by mouth daily  - Lipid panel reflex to direct LDL Non-fasting; Future  - Comprehensive metabolic panel (BMP + Alb, Alk Phos, ALT, AST, Total. Bili, TP); Future  - Comprehensive metabolic panel (BMP + Alb, Alk Phos, ALT, AST, Total. Bili, TP)  - Lipid panel reflex to direct LDL Non-fasting    Moderate major depression (H)  -doing well   - REVIEW OF HEALTH MAINTENANCE PROTOCOL ORDERS  - sertraline (ZOLOFT) 50 MG tablet; Take 1 tablet (50 mg) by mouth daily    Hypothyroidism, unspecified type  -clinically euthyroid, labs pending. Patient states she gained some weight but mainly due to poor diet and lack of exercise since she retired   - levothyroxine (SYNTHROID/LEVOTHROID) 100 MCG tablet; Take 1 tablet (100 mcg) by mouth daily  - TSH with free T4 reflex; Future  - TSH with free T4 reflex    Lipoma of skin and subcutaneous tissue  -small, soft, non-tender lump on the lateral side of the right lower extremity. Possible lipoma, vs cyst. Recommended monitoring.     The 10-year ASCVD risk score (Jose DK, et al., 2019) is: 11%    Values used to calculate the score:      Age: 58 years      Sex: Female      Is Non- : No      Diabetic: No      Tobacco smoker: Yes      Systolic Blood Pressure: 130 mmHg      Is BP treated: Yes      HDL Cholesterol: 60 mg/dL      Total Cholesterol: 263 mg/dL   Recommended patient to follow healthy diet and recheck lipid panel again in 6 months    Arnoldo Pacheco is a 58 year old, presenting for the following health issues:  Medication Refill and Derm Problem (Has a soft lump above her right ankle she would like looked at. Also has a mole on right upper thigh. )        5/15/2024     8:21 AM   Additional Questions   Roomed by indira    Accompanied by self         5/15/2024     8:21 AM   Patient Reported Additional Medications   Patient reports taking the following new medications none     History of Present Illness       Mental Health Follow-up:  Patient presents to follow-up on Depression & Anxiety.Patient's depression since last visit has been:  No change  The patient is not having other symptoms associated with depression.  Patient's anxiety since last visit has been:  No change  The patient is not having other symptoms associated with anxiety.  Any significant life events: relationship concerns  Patient is not feeling anxious or having panic attacks.  Patient has no concerns about alcohol or drug use.    Hyperlipidemia:  She presents for follow up of hyperlipidemia.   She is not taking medication to lower cholesterol. She is not having myalgia or other side effects to statin medications.    Hypertension: She presents for follow up of hypertension.  She does not check blood pressure  regularly outside of the clinic. Outside blood pressures have been over 140/90. She follows a low salt diet.     Hypothyroidism:     Since last visit, patient describes the following symptoms::  Depression, Fatigue, Loose stools and Weight gain    Weight gain::  6-10 lbs.    She eats 0-1 servings of fruits and vegetables daily.She consumes 1 sweetened beverage(s) daily.She exercises with enough effort to increase her heart rate 9 or less minutes per day.  She exercises with enough effort to increase her heart rate 3 or less days per week. She is missing 1 dose(s) of medications per week.  She is not taking prescribed medications regularly due to other.     LUMP: Lump above right ankle. Has been there for 6 months, no pain.           Review of Systems  Constitutional, HEENT, cardiovascular, pulmonary, gi and gu systems are negative, except as otherwise noted.      Objective    Temp 98.5  F (36.9  C) (Tympanic)   Resp 16   Wt 80.6 kg (177 lb 12.8 oz)   LMP  03/12/2011   BMI 31.25 kg/m    Body mass index is 31.25 kg/m .  Physical Exam   GENERAL: alert and no distress  EYES: Eyes grossly normal to inspection, PERRL and conjunctivae and sclerae normal  NECK: no adenopathy, no asymmetry, masses, or scars  RESP: lungs clear to auscultation - no rales, rhonchi or wheezes  CV: regular rate and rhythm, normal S1 S2, no S3 or S4, no murmur, click or rub, no peripheral edema   SKIN: small, soft, non-tender lump on the lateral side of the right lower extremity  NEURO: Normal strength and tone, mentation intact and speech normal  PSYCH: mentation appears normal, affect normal/bright            Signed Electronically by: ANT Hoffman CNP

## 2024-05-16 RX ORDER — METOPROLOL SUCCINATE 100 MG/1
100 TABLET, EXTENDED RELEASE ORAL DAILY
Qty: 90 TABLET | Refills: 3 | Status: SHIPPED | OUTPATIENT
Start: 2024-05-16

## 2024-05-16 RX ORDER — LEVOTHYROXINE SODIUM 100 UG/1
100 TABLET ORAL DAILY
Qty: 90 TABLET | Refills: 3 | Status: SHIPPED | OUTPATIENT
Start: 2024-05-16

## 2024-05-16 RX ORDER — AMLODIPINE BESYLATE 5 MG/1
5 TABLET ORAL DAILY
Qty: 90 TABLET | Refills: 3 | Status: SHIPPED | OUTPATIENT
Start: 2024-05-16

## 2024-06-21 ENCOUNTER — PATIENT OUTREACH (OUTPATIENT)
Dept: CARE COORDINATION | Facility: CLINIC | Age: 59
End: 2024-06-21
Payer: COMMERCIAL

## 2024-06-30 ENCOUNTER — HEALTH MAINTENANCE LETTER (OUTPATIENT)
Age: 59
End: 2024-06-30

## 2024-07-05 ENCOUNTER — PATIENT OUTREACH (OUTPATIENT)
Dept: CARE COORDINATION | Facility: CLINIC | Age: 59
End: 2024-07-05
Payer: COMMERCIAL

## 2024-09-08 ENCOUNTER — HEALTH MAINTENANCE LETTER (OUTPATIENT)
Age: 59
End: 2024-09-08

## 2024-09-12 ENCOUNTER — TELEPHONE (OUTPATIENT)
Dept: FAMILY MEDICINE | Facility: CLINIC | Age: 59
End: 2024-09-12
Payer: COMMERCIAL

## 2024-09-12 NOTE — TELEPHONE ENCOUNTER
Patient Quality Outreach    Patient is due for the following:   Colon Cancer Screening  Breast Cancer Screening - Mammogram  Physical Annual Wellness Visit    Next Steps:   Schedule a Annual Wellness Visit    Type of outreach:    Sent letter.    Next Steps:  Reach out within 90 days via Letter.    Max number of attempts reached: No. Will try again in 90 days if patient still on fail list.    Questions for provider review:    None           Chely Lazcano CMA  Chart routed to none.

## 2024-09-12 NOTE — LETTER
September 12, 2024    To  Tara Charlton  [address] [place] [address]    Your team at [facility] cares about your health. We have reviewed your chart and based on our findings; we are making the following recommendations to better manage your health.     You are in particular need of attention regarding the following:     Call or MyChart message your clinic to schedule a colonoscopy, schedule/ a FIT Test, or order a Cologuard test. If you are unsure what type of test you need, please call your clinic and speak to clinic staff.   Colon cancer is now the second leading cause of cancer-related deaths in the [facility] for both men and women and there are over 130,000 new cases and 50,000 deaths per year from colon cancer. Colonoscopies can prevent 90-95% of these deaths. Problem lesions can be removed before they ever become cancer. This test is not only looking for cancer, but also getting rid of precancerous lesions.   If you are under/uninsured, we recommend you contact the [employer] Program.[employer] is a free colorectal cancer screening program that provides colonoscopies for eligible under/uninsured [place] men and women. If you are interested in receiving a free colonoscopy, please call [employer] at t [phone] (mention code [employer]) to see if you're eligible. Please have them send us the results.   Schedule Annual MAMMOGRAPHY. [facility] scheduling number is [phone] or schedule in MyChart (self referral).    If you have already completed these items, please contact the clinic via phone or   [employer] so your care team can review and update your records. Thank you for   choosing [facility] for your healthcare needs. For any questions,   concerns, or to schedule an appointment please contact our clinic.    Healthy Regards,      Your [facility] Care Team

## 2024-10-31 ENCOUNTER — OFFICE VISIT (OUTPATIENT)
Dept: DERMATOLOGY | Facility: CLINIC | Age: 59
End: 2024-10-31
Payer: COMMERCIAL

## 2024-10-31 DIAGNOSIS — D18.01 ANGIOMA OF SKIN: ICD-10-CM

## 2024-10-31 DIAGNOSIS — L81.4 LENTIGO: ICD-10-CM

## 2024-10-31 DIAGNOSIS — L82.1 SEBORRHEIC KERATOSIS: ICD-10-CM

## 2024-10-31 DIAGNOSIS — D22.9 NEVUS: Primary | ICD-10-CM

## 2024-10-31 PROCEDURE — G2211 COMPLEX E/M VISIT ADD ON: HCPCS | Performed by: PHYSICIAN ASSISTANT

## 2024-10-31 PROCEDURE — 99203 OFFICE O/P NEW LOW 30 MIN: CPT | Performed by: PHYSICIAN ASSISTANT

## 2024-10-31 NOTE — NURSING NOTE
Tara Charlton's chief complaint for this visit includes:  Chief Complaint   Patient presents with    Skin Check     Patient is here for a skin check and presents concern with a spot on right post thigh.      PCP: Emelia Moreno    Referring Provider:  Referred Self, MD  No address on file    LMP 03/12/2011   Data Unavailable      No Known Allergies      Do you need any medication refills at today's visit? No    Patricia Harris MA

## 2024-10-31 NOTE — PROGRESS NOTES
HPI:   Chief complaints: Tara Charlton is a pleasant 59 year old female who presents for Full skin cancer screening to rule out skin cancer   Last Skin Exam: n/a      1st Baseline: yes  Personal HX of Skin Cancer: no   Personal HX of Malignant Melanoma: no   Family HX of Skin Cancer / Malignant Melanoma: no  Personal HX of Atypical Moles:   no  Risk factors: history of sun exposure and burns  New / Changing lesions:new spot on the right thigh  Social History:   On review of systems, there are no further skin complaints, patient is feeling otherwise well.   ROS of the following were done and are negative: Constitutional, Eyes, Ears, Nose,   Mouth, Throat, Cardiovascular, Respiratory, GI, Genitourinary, Musculoskeletal,   Psychiatric, Endocrine, Allergic/Immunologic.    PHYSICAL EXAM:   LMP 03/12/2011   Skin exam performed as follows: Type 2 skin. Mood appropriate  Alert and Oriented X 3. Well developed, well nourished in no distress.  General appearance: Normal  Head including face: Normal  Eyes: conjunctiva and lids: Normal  Mouth: Lips, teeth, gums: Normal  Neck: Normal  Chest-breast/axillae: Normal  Back: Normal  Extremities: digits/nails (clubbing): Normal  Eccrine and Apocrine glands: Normal  Right upper extremity: Normal  Left upper extremity: Normal  Right lower extremity: Normal  Left lower extremity: Normal  Skin: Scalp and body hair: See below    Pt deferred exam of breasts, groin, buttocks: No    Other physical findings:  1. Multiple pigmented macules on extremities and trunk  2. Multiple pigmented macules on face, trunk and extremities  3. Multiple vascular papules on trunk, arms and legs  4. Multiple scattered keratotic plaques       Except as noted above, no other signs of skin cancer or melanoma.     ASSESSMENT/PLAN:   Benign Full skin cancer screening today. . Patient with history of none  Advised on monthly self exams and 1 year  Patient Education: Appropriate brochures given.    Multiple benign  appearing melanocytic nevi on arms, legs and trunk. Discussed ABCDEs of melanoma and sunscreen.   Multiple lentigos on arms, legs and trunk. Advised benign, no treatment needed.  Multiple scattered angiomas. Advised benign, no treatment needed.   Seborrheic keratosis on arms, legs and trunk. Advised benign, no treatment needed.          Follow-up: FSE every 1-2 years/PRN sooner    1.) Patient was asked about new and changing moles. YES  2.) Patient received a complete physical skin examination: YES  3.) Patient was counseled to perform a monthly self skin examination: YES  Scribed By: Lala Heath, MS, PA-C

## 2024-10-31 NOTE — LETTER
10/31/2024      Tara Charlton  [address]  [facility] [id number]      Dear Colleague,    Thank you for referring your patient, Tara Cahrlton, to the [facility]. Please see a copy of my visit note below.    HPI:   Chief complaints: Tara Charlton is a pleasant 59 year old female who presents for Full skin cancer screening to rule out skin cancer   Last Skin Exam: n/a      1st Baseline: yes  Personal HX of Skin Cancer: no   Personal HX of Malignant Melanoma: no   Family HX of Skin Cancer / Malignant Melanoma: no  Personal HX of Atypical Moles:   no  Risk factors: history of sun exposure and burns  New / Changing lesions:new spot on the right thigh  Social History:   On review of systems, there are no further skin complaints, patient is feeling otherwise well.   ROS of the following were done and are negative: Constitutional, Eyes, Ears, Nose,   Mouth, Throat, Cardiovascular, Respiratory, GI, Genitourinary, Musculoskeletal,   Psychiatric, Endocrine, Allergic/Immunologic.    PHYSICAL EXAM:   LMP 03/12/2011   Skin exam performed as follows: Type 2 skin. Mood appropriate  Alert and Oriented X 3. Well developed, well nourished in no distress.  General appearance: Normal  Head including face: Normal  Eyes: conjunctiva and lids: Normal  Mouth: Lips, teeth, gums: Normal  Neck: Normal  Chest-breast/axillae: Normal  Back: Normal  Extremities: digits/nails (clubbing): Normal  Eccrine and Apocrine glands: Normal  Right upper extremity: Normal  Left upper extremity: Normal  Right lower extremity: Normal  Left lower extremity: Normal  Skin: Scalp and body hair: See below    Pt deferred exam of breasts, groin, buttocks: No    Other physical findings:  1. Multiple pigmented macules on extremities and trunk  2. Multiple pigmented macules on face, trunk and extremities  3. Multiple vascular papules on trunk, arms and legs  4. Multiple scattered keratotic plaques       Except as noted above, no  other signs of skin cancer or melanoma.     ASSESSMENT/PLAN:   Benign Full skin cancer screening today. . Patient with history of none  Advised on monthly self exams and 1 year  Patient Education: Appropriate brochures given.    Multiple benign appearing melanocytic nevi on arms, legs and trunk. Discussed ABCDEs of melanoma and sunscreen.   Multiple lentigos on arms, legs and trunk. Advised benign, no treatment needed.  Multiple scattered angiomas. Advised benign, no treatment needed.   Seborrheic keratosis on arms, legs and trunk. Advised benign, no treatment needed.          Follow-up: FSE every 1-2 years/PRN sooner    1.) Patient was asked about new and changing moles. YES  2.) Patient received a complete physical skin examination: YES  3.) Patient was counseled to perform a monthly self skin examination: YES  Scribed By: Lala Heath, MS, PADEANDRE      Again, thank you for allowing me to participate in the care of your patient.        Sincerely,        Lala Heath PA-C

## 2025-01-07 ENCOUNTER — TELEPHONE (OUTPATIENT)
Dept: FAMILY MEDICINE | Facility: CLINIC | Age: 60
End: 2025-01-07
Payer: COMMERCIAL

## 2025-01-07 NOTE — TELEPHONE ENCOUNTER
Patient Quality Outreach    Patient is due for the following:   Colon Cancer Screening  Breast Cancer Screening - Mammogram  Physical Preventive Adult Physical      Topic Date Due    Zoster (Shingles) Vaccine (1 of 2) Never done    Flu Vaccine (1) 09/01/2024    COVID-19 Vaccine (5 - 2024-25 season) 09/01/2024       Action(s) Taken:   Schedule a Adult Preventative, mammogram, colon cancer screen, immunizations    Type of outreach:    Sent eMindful message.    Questions for provider review:    None           Arianne Tomlinson CMA

## 2025-04-16 ENCOUNTER — TELEPHONE (OUTPATIENT)
Dept: FAMILY MEDICINE | Facility: CLINIC | Age: 60
End: 2025-04-16
Payer: COMMERCIAL

## 2025-08-06 ENCOUNTER — TELEPHONE (OUTPATIENT)
Dept: FAMILY MEDICINE | Facility: CLINIC | Age: 60
End: 2025-08-06
Payer: COMMERCIAL